# Patient Record
Sex: MALE | Race: WHITE | NOT HISPANIC OR LATINO | Employment: OTHER | URBAN - METROPOLITAN AREA
[De-identification: names, ages, dates, MRNs, and addresses within clinical notes are randomized per-mention and may not be internally consistent; named-entity substitution may affect disease eponyms.]

---

## 2021-09-18 ENCOUNTER — HOSPITAL ENCOUNTER (INPATIENT)
Facility: CLINIC | Age: 76
LOS: 3 days | Discharge: HOME | DRG: 392 | End: 2021-09-21
Attending: INTERNAL MEDICINE | Admitting: INTERNAL MEDICINE
Payer: COMMERCIAL

## 2021-09-18 ENCOUNTER — APPOINTMENT (OUTPATIENT)
Dept: CT IMAGING | Facility: CLINIC | Age: 76
End: 2021-09-18
Attending: EMERGENCY MEDICINE
Payer: COMMERCIAL

## 2021-09-18 ENCOUNTER — HOSPITAL ENCOUNTER (EMERGENCY)
Facility: CLINIC | Age: 76
Discharge: HOME | End: 2021-09-18
Attending: EMERGENCY MEDICINE | Admitting: EMERGENCY MEDICINE
Payer: COMMERCIAL

## 2021-09-18 VITALS
RESPIRATION RATE: 20 BRPM | BODY MASS INDEX: 27 KG/M2 | TEMPERATURE: 97.8 F | HEART RATE: 64 BPM | HEIGHT: 66 IN | SYSTOLIC BLOOD PRESSURE: 129 MMHG | WEIGHT: 168 LBS | OXYGEN SATURATION: 98 % | DIASTOLIC BLOOD PRESSURE: 67 MMHG

## 2021-09-18 DIAGNOSIS — K50.012 TERMINAL ILEITIS, WITH INTESTINAL OBSTRUCTION (H): ICD-10-CM

## 2021-09-18 DIAGNOSIS — K56.609 SBO (SMALL BOWEL OBSTRUCTION) (H): Primary | ICD-10-CM

## 2021-09-18 DIAGNOSIS — K29.70 GASTRITIS WITHOUT BLEEDING, UNSPECIFIED CHRONICITY, UNSPECIFIED GASTRITIS TYPE: ICD-10-CM

## 2021-09-18 DIAGNOSIS — K56.609 SBO (SMALL BOWEL OBSTRUCTION) (H): ICD-10-CM

## 2021-09-18 LAB
ALBUMIN SERPL-MCNC: 3.2 G/DL (ref 3.4–5)
ALP SERPL-CCNC: 85 U/L (ref 40–150)
ALT SERPL W P-5'-P-CCNC: 26 U/L (ref 0–70)
ANION GAP SERPL CALCULATED.3IONS-SCNC: 5 MMOL/L (ref 3–14)
AST SERPL W P-5'-P-CCNC: 18 U/L (ref 0–45)
BASOPHILS # BLD AUTO: 0 10E3/UL (ref 0–0.2)
BASOPHILS NFR BLD AUTO: 0 %
BILIRUB SERPL-MCNC: 0.6 MG/DL (ref 0.2–1.3)
BUN SERPL-MCNC: 16 MG/DL (ref 7–30)
CALCIUM SERPL-MCNC: 8.5 MG/DL (ref 8.5–10.1)
CHLORIDE BLD-SCNC: 107 MMOL/L (ref 94–109)
CO2 SERPL-SCNC: 28 MMOL/L (ref 20–32)
CREAT SERPL-MCNC: 1.13 MG/DL (ref 0.66–1.25)
EOSINOPHIL # BLD AUTO: 0.1 10E3/UL (ref 0–0.7)
EOSINOPHIL NFR BLD AUTO: 2 %
ERYTHROCYTE [DISTWIDTH] IN BLOOD BY AUTOMATED COUNT: 14.4 % (ref 10–15)
GFR SERPL CREATININE-BSD FRML MDRD: 64 ML/MIN/1.73M2
GLUCOSE BLD-MCNC: 103 MG/DL (ref 70–99)
HCT VFR BLD AUTO: 43.4 % (ref 40–53)
HGB BLD-MCNC: 14.3 G/DL (ref 13.3–17.7)
HOLD SPECIMEN: NORMAL
IMM GRANULOCYTES # BLD: 0 10E3/UL
IMM GRANULOCYTES NFR BLD: 0 %
LACTATE SERPL-SCNC: 0.7 MMOL/L (ref 0.7–2)
LYMPHOCYTES # BLD AUTO: 1.7 10E3/UL (ref 0.8–5.3)
LYMPHOCYTES NFR BLD AUTO: 21 %
MAGNESIUM SERPL-MCNC: 1.7 MG/DL (ref 1.8–2.6)
MCH RBC QN AUTO: 29.8 PG (ref 26.5–33)
MCHC RBC AUTO-ENTMCNC: 32.9 G/DL (ref 31.5–36.5)
MCV RBC AUTO: 90 FL (ref 78–100)
MONOCYTES # BLD AUTO: 0.9 10E3/UL (ref 0–1.3)
MONOCYTES NFR BLD AUTO: 11 %
NEUTROPHILS # BLD AUTO: 5.4 10E3/UL (ref 1.6–8.3)
NEUTROPHILS NFR BLD AUTO: 66 %
NRBC # BLD AUTO: 0 10E3/UL
NRBC BLD AUTO-RTO: 0 /100
PLATELET # BLD AUTO: 186 10E3/UL (ref 150–450)
POTASSIUM BLD-SCNC: 3.8 MMOL/L (ref 3.5–5)
POTASSIUM BLD-SCNC: 4 MMOL/L (ref 3.4–5.3)
PROT SERPL-MCNC: 6.8 G/DL (ref 6.8–8.8)
RBC # BLD AUTO: 4.8 10E6/UL (ref 4.4–5.9)
SARS-COV-2 RNA RESP QL NAA+PROBE: NEGATIVE
SODIUM SERPL-SCNC: 140 MMOL/L (ref 133–144)
WBC # BLD AUTO: 8.1 10E3/UL (ref 4–11)

## 2021-09-18 PROCEDURE — 250N000013 HC RX MED GY IP 250 OP 250 PS 637: Performed by: INTERNAL MEDICINE

## 2021-09-18 PROCEDURE — 85025 COMPLETE CBC W/AUTO DIFF WBC: CPT | Performed by: EMERGENCY MEDICINE

## 2021-09-18 PROCEDURE — 87635 SARS-COV-2 COVID-19 AMP PRB: CPT | Performed by: EMERGENCY MEDICINE

## 2021-09-18 PROCEDURE — 96375 TX/PRO/DX INJ NEW DRUG ADDON: CPT | Performed by: EMERGENCY MEDICINE

## 2021-09-18 PROCEDURE — 83735 ASSAY OF MAGNESIUM: CPT | Performed by: INTERNAL MEDICINE

## 2021-09-18 PROCEDURE — 250N000011 HC RX IP 250 OP 636: Performed by: FAMILY MEDICINE

## 2021-09-18 PROCEDURE — 36415 COLL VENOUS BLD VENIPUNCTURE: CPT | Performed by: EMERGENCY MEDICINE

## 2021-09-18 PROCEDURE — 99223 1ST HOSP IP/OBS HIGH 75: CPT | Performed by: INTERNAL MEDICINE

## 2021-09-18 PROCEDURE — 99285 EMERGENCY DEPT VISIT HI MDM: CPT | Performed by: EMERGENCY MEDICINE

## 2021-09-18 PROCEDURE — 84132 ASSAY OF SERUM POTASSIUM: CPT | Performed by: INTERNAL MEDICINE

## 2021-09-18 PROCEDURE — 250N000011 HC RX IP 250 OP 636: Performed by: EMERGENCY MEDICINE

## 2021-09-18 PROCEDURE — 36415 COLL VENOUS BLD VENIPUNCTURE: CPT | Performed by: INTERNAL MEDICINE

## 2021-09-18 PROCEDURE — 120N000001 HC R&B MED SURG/OB

## 2021-09-18 PROCEDURE — 99285 EMERGENCY DEPT VISIT HI MDM: CPT | Mod: 25 | Performed by: EMERGENCY MEDICINE

## 2021-09-18 PROCEDURE — 258N000003 HC RX IP 258 OP 636: Performed by: EMERGENCY MEDICINE

## 2021-09-18 PROCEDURE — C9113 INJ PANTOPRAZOLE SODIUM, VIA: HCPCS | Performed by: FAMILY MEDICINE

## 2021-09-18 PROCEDURE — 96374 THER/PROPH/DIAG INJ IV PUSH: CPT | Mod: 59 | Performed by: EMERGENCY MEDICINE

## 2021-09-18 PROCEDURE — 86141 C-REACTIVE PROTEIN HS: CPT | Performed by: HOSPITALIST

## 2021-09-18 PROCEDURE — 250N000011 HC RX IP 250 OP 636: Performed by: INTERNAL MEDICINE

## 2021-09-18 PROCEDURE — 83605 ASSAY OF LACTIC ACID: CPT | Performed by: EMERGENCY MEDICINE

## 2021-09-18 PROCEDURE — 250N000009 HC RX 250: Performed by: EMERGENCY MEDICINE

## 2021-09-18 PROCEDURE — 96361 HYDRATE IV INFUSION ADD-ON: CPT | Performed by: EMERGENCY MEDICINE

## 2021-09-18 PROCEDURE — 80053 COMPREHEN METABOLIC PANEL: CPT | Performed by: EMERGENCY MEDICINE

## 2021-09-18 PROCEDURE — 74177 CT ABD & PELVIS W/CONTRAST: CPT

## 2021-09-18 PROCEDURE — C9803 HOPD COVID-19 SPEC COLLECT: HCPCS | Performed by: EMERGENCY MEDICINE

## 2021-09-18 RX ORDER — AMLODIPINE BESYLATE 5 MG/1
1 TABLET ORAL EVERY EVENING
COMMUNITY
Start: 2021-09-03

## 2021-09-18 RX ORDER — NALOXONE HYDROCHLORIDE 0.4 MG/ML
0.4 INJECTION, SOLUTION INTRAMUSCULAR; INTRAVENOUS; SUBCUTANEOUS
Status: DISCONTINUED | OUTPATIENT
Start: 2021-09-18 | End: 2021-09-21 | Stop reason: HOSPADM

## 2021-09-18 RX ORDER — CLOPIDOGREL BISULFATE 75 MG/1
1 TABLET ORAL DAILY
Status: ON HOLD | COMMUNITY
Start: 2021-09-03 | End: 2021-10-15

## 2021-09-18 RX ORDER — LEVOFLOXACIN 500 MG/1
500 TABLET, FILM COATED ORAL DAILY
Status: ON HOLD | COMMUNITY
Start: 2021-09-13 | End: 2021-09-21

## 2021-09-18 RX ORDER — NALOXONE HYDROCHLORIDE 0.4 MG/ML
0.2 INJECTION, SOLUTION INTRAMUSCULAR; INTRAVENOUS; SUBCUTANEOUS
Status: DISCONTINUED | OUTPATIENT
Start: 2021-09-18 | End: 2021-09-21 | Stop reason: HOSPADM

## 2021-09-18 RX ORDER — IOPAMIDOL 755 MG/ML
82 INJECTION, SOLUTION INTRAVASCULAR ONCE
Status: COMPLETED | OUTPATIENT
Start: 2021-09-18 | End: 2021-09-18

## 2021-09-18 RX ORDER — SODIUM CHLORIDE AND POTASSIUM CHLORIDE 150; 900 MG/100ML; MG/100ML
INJECTION, SOLUTION INTRAVENOUS CONTINUOUS
Status: DISCONTINUED | OUTPATIENT
Start: 2021-09-18 | End: 2021-09-20

## 2021-09-18 RX ORDER — LANOLIN ALCOHOL/MO/W.PET/CERES
1 CREAM (GRAM) TOPICAL EVERY EVENING
COMMUNITY

## 2021-09-18 RX ORDER — SODIUM CHLORIDE, SODIUM LACTATE, POTASSIUM CHLORIDE, CALCIUM CHLORIDE 600; 310; 30; 20 MG/100ML; MG/100ML; MG/100ML; MG/100ML
INJECTION, SOLUTION INTRAVENOUS CONTINUOUS
Status: DISCONTINUED | OUTPATIENT
Start: 2021-09-18 | End: 2021-09-18 | Stop reason: HOSPADM

## 2021-09-18 RX ORDER — CLOPIDOGREL BISULFATE 75 MG/1
75 TABLET ORAL DAILY
Status: DISCONTINUED | OUTPATIENT
Start: 2021-09-19 | End: 2021-09-21 | Stop reason: HOSPADM

## 2021-09-18 RX ORDER — MIDAZOLAM HYDROCHLORIDE 5 MG/ML
2 INJECTION, SOLUTION INTRAMUSCULAR; INTRAVENOUS ONCE
Status: DISCONTINUED | OUTPATIENT
Start: 2021-09-18 | End: 2021-09-18

## 2021-09-18 RX ORDER — DOXYCYCLINE 100 MG/1
1 CAPSULE ORAL 2 TIMES DAILY
Status: ON HOLD | COMMUNITY
Start: 2021-09-14 | End: 2022-06-28

## 2021-09-18 RX ORDER — PREDNISONE 20 MG/1
2 TABLET ORAL DAILY PRN
Status: ON HOLD | COMMUNITY
Start: 2021-09-14 | End: 2022-06-28

## 2021-09-18 RX ORDER — ACETAMINOPHEN 650 MG/1
650 SUPPOSITORY RECTAL EVERY 6 HOURS PRN
Status: DISCONTINUED | OUTPATIENT
Start: 2021-09-18 | End: 2021-09-21 | Stop reason: HOSPADM

## 2021-09-18 RX ORDER — DOXYCYCLINE 100 MG/1
100 CAPSULE ORAL 2 TIMES DAILY
Status: DISCONTINUED | OUTPATIENT
Start: 2021-09-18 | End: 2021-09-21 | Stop reason: HOSPADM

## 2021-09-18 RX ORDER — ACETAMINOPHEN 500 MG
500 TABLET ORAL EVERY 6 HOURS PRN
COMMUNITY

## 2021-09-18 RX ORDER — MULTIVITAMIN,THER AND MINERALS
1 TABLET ORAL DAILY
Status: ON HOLD | COMMUNITY
End: 2021-09-18

## 2021-09-18 RX ORDER — ALBUTEROL SULFATE 90 UG/1
1-2 AEROSOL, METERED RESPIRATORY (INHALATION) EVERY 6 HOURS PRN
COMMUNITY
Start: 2021-09-14

## 2021-09-18 RX ORDER — TERAZOSIN 5 MG/1
5 CAPSULE ORAL AT BEDTIME
Status: DISCONTINUED | OUTPATIENT
Start: 2021-09-18 | End: 2021-09-21 | Stop reason: HOSPADM

## 2021-09-18 RX ORDER — TERAZOSIN 5 MG/1
1 CAPSULE ORAL AT BEDTIME
COMMUNITY
Start: 2021-09-03

## 2021-09-18 RX ORDER — ATORVASTATIN CALCIUM 10 MG/1
1 TABLET, FILM COATED ORAL EVERY EVENING
COMMUNITY
Start: 2021-09-03

## 2021-09-18 RX ORDER — PNV NO.95/FERROUS FUM/FOLIC AC 28MG-0.8MG
1 TABLET ORAL EVERY EVENING
COMMUNITY

## 2021-09-18 RX ORDER — AMLODIPINE BESYLATE 5 MG/1
5 TABLET ORAL EVERY EVENING
Status: DISCONTINUED | OUTPATIENT
Start: 2021-09-18 | End: 2021-09-21 | Stop reason: HOSPADM

## 2021-09-18 RX ORDER — ACETAMINOPHEN 325 MG/1
650 TABLET ORAL EVERY 6 HOURS PRN
Status: DISCONTINUED | OUTPATIENT
Start: 2021-09-18 | End: 2021-09-21 | Stop reason: HOSPADM

## 2021-09-18 RX ORDER — LIDOCAINE 40 MG/G
CREAM TOPICAL
Status: DISCONTINUED | OUTPATIENT
Start: 2021-09-18 | End: 2021-09-21 | Stop reason: HOSPADM

## 2021-09-18 RX ORDER — LIDOCAINE HYDROCHLORIDE 20 MG/ML
JELLY TOPICAL ONCE
Status: DISCONTINUED | OUTPATIENT
Start: 2021-09-18 | End: 2021-09-18

## 2021-09-18 RX ORDER — ATORVASTATIN CALCIUM 10 MG/1
10 TABLET, FILM COATED ORAL EVERY EVENING
Status: DISCONTINUED | OUTPATIENT
Start: 2021-09-18 | End: 2021-09-21 | Stop reason: HOSPADM

## 2021-09-18 RX ORDER — HYDROMORPHONE HYDROCHLORIDE 1 MG/ML
0.5 INJECTION, SOLUTION INTRAMUSCULAR; INTRAVENOUS; SUBCUTANEOUS
Status: DISCONTINUED | OUTPATIENT
Start: 2021-09-18 | End: 2021-09-18 | Stop reason: HOSPADM

## 2021-09-18 RX ORDER — PANTOPRAZOLE SODIUM 40 MG/1
40 TABLET, DELAYED RELEASE ORAL 2 TIMES DAILY
Status: ON HOLD | COMMUNITY
Start: 2021-09-02 | End: 2021-09-21

## 2021-09-18 RX ORDER — ONDANSETRON 2 MG/ML
4 INJECTION INTRAMUSCULAR; INTRAVENOUS ONCE
Status: COMPLETED | OUTPATIENT
Start: 2021-09-18 | End: 2021-09-18

## 2021-09-18 RX ORDER — MULTIPLE VITAMINS W/ MINERALS TAB 9MG-400MCG
1 TAB ORAL EVERY EVENING
COMMUNITY

## 2021-09-18 RX ADMIN — METRONIDAZOLE 500 MG: 500 INJECTION, SOLUTION INTRAVENOUS at 21:52

## 2021-09-18 RX ADMIN — SODIUM CHLORIDE 61 ML: 9 INJECTION, SOLUTION INTRAVENOUS at 11:06

## 2021-09-18 RX ADMIN — AMLODIPINE BESYLATE 5 MG: 5 TABLET ORAL at 22:11

## 2021-09-18 RX ADMIN — SODIUM CHLORIDE, POTASSIUM CHLORIDE, SODIUM LACTATE AND CALCIUM CHLORIDE: 600; 310; 30; 20 INJECTION, SOLUTION INTRAVENOUS at 18:28

## 2021-09-18 RX ADMIN — SODIUM CHLORIDE, POTASSIUM CHLORIDE, SODIUM LACTATE AND CALCIUM CHLORIDE 1000 ML: 600; 310; 30; 20 INJECTION, SOLUTION INTRAVENOUS at 12:37

## 2021-09-18 RX ADMIN — PANTOPRAZOLE SODIUM 40 MG: 40 INJECTION, POWDER, FOR SOLUTION INTRAVENOUS at 18:37

## 2021-09-18 RX ADMIN — IOPAMIDOL 82 ML: 755 INJECTION, SOLUTION INTRAVENOUS at 11:06

## 2021-09-18 RX ADMIN — ATORVASTATIN CALCIUM 10 MG: 10 TABLET, FILM COATED ORAL at 22:12

## 2021-09-18 RX ADMIN — ONDANSETRON 4 MG: 2 INJECTION INTRAMUSCULAR; INTRAVENOUS at 10:36

## 2021-09-18 RX ADMIN — POTASSIUM CHLORIDE AND SODIUM CHLORIDE: 900; 150 INJECTION, SOLUTION INTRAVENOUS at 21:50

## 2021-09-18 RX ADMIN — TERAZOSIN HYDROCHLORIDE 5 MG: 5 CAPSULE ORAL at 22:12

## 2021-09-18 RX ADMIN — HYDROMORPHONE HYDROCHLORIDE 0.5 MG: 1 INJECTION, SOLUTION INTRAMUSCULAR; INTRAVENOUS; SUBCUTANEOUS at 10:36

## 2021-09-18 ASSESSMENT — MIFFLIN-ST. JEOR: SCORE: 1444.79

## 2021-09-18 NOTE — ED PROVIDER NOTES
History     Chief Complaint   Patient presents with     Abdominal Pain     sharp stabbing pain to the lowert abdomen, onset last night. history of ruptures colon, and history of gastric surgery for weight loss 1995.      HPI  Dai Izquierdo is a 76 year old male with a history of prior gastric ring and pulmonary fibrosis who presents for abdominal pain.  Pain started last evening, sharp and stabbing in the lower abdomen, radiates throughout his abdomen.  Pain is rated as severe.  He has nausea but no vomiting.  No diarrhea.  He denies fever, chills, chest pain, shortness of breath, cough, dysuria, or rash.    Allergies:  No Known Allergies    Problem List:    There are no problems to display for this patient.       Past Medical History:    Pulmonary fibrosis  Hypertension    Past Surgical History:    Gastric sleeve    Family History:    No family history on file.    Social History:  Marital Status:   [2]  Social History     Tobacco Use     Smoking status: Not on file   Substance Use Topics     Alcohol use: Not on file     Drug use: Not on file        Medications:    acetaminophen (TYLENOL) 500 MG tablet  acetaminophen-codeine (TYLENOL #3) 300-30 MG tablet  albuterol (PROAIR HFA/PROVENTIL HFA/VENTOLIN HFA) 108 (90 Base) MCG/ACT inhaler  amLODIPine (NORVASC) 5 MG tablet  atorvastatin (LIPITOR) 10 MG tablet  clopidogrel (PLAVIX) 75 MG tablet  cyanocobalamin (VITAMIN B-12) 1000 MCG tablet  INCRUSE ELLIPTA 62.5 MCG/INH Inhaler  levofloxacin (LEVAQUIN) 500 MG tablet  Multiple Vitamins-Minerals (SUPER THERA ADITYA M) TABS  Multiple Vitamins-Minerals (ZINC PO)  pantoprazole (PROTONIX) 40 MG EC tablet  terazosin (HYTRIN) 5 MG capsule  doxycycline monohydrate (MONODOX) 100 MG capsule  predniSONE (DELTASONE) 20 MG tablet          Review of Systems  Pertinent positives and negatives listed in the HPI, all other systems reviewed and are negative.    Physical Exam   BP: (!) 151/85  Pulse: 70  Temp: 97.8  F (36.6  " C)  Resp: 20  Height: 167.6 cm (5' 6\")  Weight: 76.2 kg (168 lb)  SpO2: 98 %      Physical Exam  Vitals and nursing note reviewed.   Constitutional:       General: He is in acute distress.      Appearance: He is well-developed. He is not diaphoretic.   HENT:      Head: Normocephalic and atraumatic.      Right Ear: External ear normal.      Left Ear: External ear normal.      Nose: Nose normal.   Eyes:      General: No scleral icterus.     Conjunctiva/sclera: Conjunctivae normal.   Cardiovascular:      Rate and Rhythm: Normal rate and regular rhythm.   Pulmonary:      Effort: Pulmonary effort is normal. No respiratory distress.      Breath sounds: No stridor.   Abdominal:      General: There is no distension.      Palpations: Abdomen is soft.      Tenderness: There is generalized abdominal tenderness. There is guarding. There is no rebound.   Musculoskeletal:      Cervical back: Normal range of motion.   Skin:     General: Skin is warm and dry.   Neurological:      Mental Status: He is alert and oriented to person, place, and time.   Psychiatric:         Behavior: Behavior normal.         ED Course        Procedures              Critical Care time:  none               Results for orders placed or performed during the hospital encounter of 09/18/21 (from the past 24 hour(s))   [place] Draw    Narrative    The following orders were created for panel order [place] Draw.  Procedure                               Abnormality         Status                     ---------                               -----------         ------                     Extra Blue Top Tube[[id number]]                              Final result               Extra Red Top Tube[[id number]]                               Final result               Extra Green Top (Lithium...[[id number]]                      Final result               Extra Green Top (Lithium...[[id number]]                                                 Extra Purple Top Tube[[id number]]     "                        Final result                 Please view results for these tests on the individual orders.   CBC with Platelets & Differential    Narrative    The following orders were created for panel order CBC with Platelets & Differential.  Procedure                               Abnormality         Status                     ---------                               -----------         ------                     CBC with platelets and d...[[id number]]                      Final result                 Please view results for these tests on the individual orders.   Comprehensive metabolic panel   Result Value Ref Range    Sodium 140 133 - 144 mmol/L    Potassium 4.0 3.4 - 5.3 mmol/L    Chloride 107 94 - 109 mmol/L    Carbon Dioxide (CO2) 28 20 - 32 mmol/L    Anion Gap 5 3 - 14 mmol/L    Urea Nitrogen 16 7 - 30 mg/dL    Creatinine 1.13 0.66 - 1.25 mg/dL    Calcium 8.5 8.5 - 10.1 mg/dL    Glucose 103 (H) 70 - 99 mg/dL    Alkaline Phosphatase 85 40 - 150 U/L    AST 18 0 - 45 U/L    ALT 26 0 - 70 U/L    Protein Total 6.8 6.8 - 8.8 g/dL    Albumin 3.2 (L) 3.4 - 5.0 g/dL    Bilirubin Total 0.6 0.2 - 1.3 mg/dL    GFR Estimate 64 >60 mL/min/1.73m2   Lactic acid whole blood   Result Value Ref Range    Lactic Acid 0.7 0.7 - 2.0 mmol/L   Extra Blue Top Tube   Result Value Ref Range    Hold Specimen JIC    Extra Red Top Tube   Result Value Ref Range    Hold Specimen JIC    Extra Green Top (Lithium Heparin) Tube   Result Value Ref Range    Hold Specimen JIC    Extra Purple Top Tube   Result Value Ref Range    Hold Specimen JIC    CBC with platelets and differential   Result Value Ref Range    WBC Count 8.1 4.0 - 11.0 10e3/uL    RBC Count 4.80 4.40 - 5.90 10e6/uL    Hemoglobin 14.3 13.3 - 17.7 g/dL    Hematocrit 43.4 40.0 - 53.0 %    MCV 90 78 - 100 fL    MCH 29.8 26.5 - 33.0 pg    MCHC 32.9 31.5 - 36.5 g/dL    RDW 14.4 10.0 - 15.0 %    Platelet Count 186 150 - 450 10e3/uL    % Neutrophils 66 %    % Lymphocytes 21 %    %  Monocytes 11 %    % Eosinophils 2 %    % Basophils 0 %    % Immature Granulocytes 0 %    NRBCs per 100 WBC 0 <1 /100    Absolute Neutrophils 5.4 1.6 - 8.3 10e3/uL    Absolute Lymphocytes 1.7 0.8 - 5.3 10e3/uL    Absolute Monocytes 0.9 0.0 - 1.3 10e3/uL    Absolute Eosinophils 0.1 0.0 - 0.7 10e3/uL    Absolute Basophils 0.0 0.0 - 0.2 10e3/uL    Absolute Immature Granulocytes 0.0 <=0.0 10e3/uL    Absolute NRBCs 0.0 10e3/uL   CT Abdomen Pelvis w Contrast    Narrative    CT ABDOMEN/PELVIS WITH CONTRAST September 18, 2021 11:21 AM    CLINICAL HISTORY: Nausea and vomiting.    TECHNIQUE: CT scan of the abdomen and pelvis was performed following  injection of IV contrast. Multiplanar reformats were obtained. Dose  reduction techniques were used.  CONTRAST: 82 mL Isovue 370.    COMPARISON: None.    FINDINGS:   LOWER CHEST: Mild to moderate fibrotic changes are noted at both lung  bases. Small hiatal hernia. Coronary artery calcification.    HEPATOBILIARY: A small calcified gallstone is noted within the  gallbladder. No hepatic masses are identified.    PANCREAS: Normal.    SPLEEN: Normal.    ADRENAL GLANDS: Normal.    KIDNEYS/BLADDER: Unremarkable. No hydronephrosis.    BOWEL: Postoperative changes are noted in the rectosigmoid region.  There is an approximately 25 cm segment of mildly thickened terminal  ileum, with mild associated inflammatory stranding. Proximal to this  thickened segment of terminal ileum, there are multiple loops of  dilated fluid-filled small bowel, suggesting some degree of associated  small bowel obstruction. Unremarkable appendix.    PELVIC ORGANS: Multiple images through the pelvis are severely  degraded due to artifact from bilateral hip arthroplasties.    LYMPH NODES: No enlarged lymph nodes are identified in the abdomen or  pelvis.    VASCULATURE: Moderate atherosclerotic aortoiliac calcification.  Prominent metallic artifact in the left upper quadrant may represent  vascular coils, possibly  within a splenic artery or renal artery  aneurysm.    ADDITIONAL FINDINGS: Trace amount of mesenteric fluid is noted within  the right lower quadrant. There are several small fat-containing  ventral hernias in the supraumbilical region.    MUSCULOSKELETAL: Unremarkable.      Impression    IMPRESSION:   1.  Mildly thickened proximally 25 cm segment of terminal ileum with  mild surrounding inflammatory stranding, consistent with terminal  ileitis, most likely infectious or inflammatory in etiology.  2.  Proximal to this thickened segment of terminal ileum, there are  multiple loops of dilated fluid-filled small bowel, suggesting some  degree of associated small bowel obstruction.  3.  Cholelithiasis.       Medications   HYDROmorphone (PF) (DILAUDID) injection 0.5 mg (0.5 mg Intravenous Given 9/18/21 1036)   lactated ringers infusion (has no administration in time range)   ondansetron (ZOFRAN) injection 4 mg (4 mg Intravenous Given 9/18/21 1036)   iopamidol (ISOVUE-370) solution 82 mL (82 mLs Intravenous Given 9/18/21 1106)   sodium chloride 0.9 % bag 500mL for CT scan flush use (61 mLs Intravenous Given 9/18/21 1106)   lactated ringers BOLUS 1,000 mL (1,000 mLs Intravenous New Bag 9/18/21 1237)       Assessments & Plan (with Medical Decision Making)   76-year-old male with a history of prior gastric sleeve and abdominal surgeries as well as a history of pulmonary fibrosis who presents for abdominal pain with nausea.  Blood pressure is 135/78, temperature is 36.6  C, heart 57, SPO2 of 99% room air.  He is given IV hydromorphone and fluids as well as IV ondansetron for symptoms.  White blood cell count is 8.1.  Lactic acid is normal which is reassuring.  Electrolytes are within normal limits.  CT of the abdomen/pelvis obtained, images reviewed independently as well as radiology read reviewed, positive for inflammation around the terminal ileum consistent with terminal ileitis.  Also with proximal dilated fluid-filled  loops of bowel consistent with a small bowel obstruction.  The patient requires admission to the hospital for further treatment.  Unfortunately there are no hospital beds available here and the patient require transfer.  There is a bed available at [facility].  I have discussed the case with the on-call hospitalist, Dr. Swanson.  He is requested we place an NG tube.  I ordered midazolam and lidocaine to facilitate this, however the patient refused stating he wanted to try passing this obstruction without that at this time but if things got worse he would have it done.  We discussed the risks of perforation and worsening symptoms in route to the other hospital and delayed treatment but the patient insisted on waiting at this time.    I have reviewed the nursing notes.    I have reviewed the findings, diagnosis, plan and need for follow up with the patient.       New Prescriptions    No medications on file       Final diagnoses:   SBO (small bowel obstruction) (H)       9/18/2021   [facility] EMERGENCY DEPT     Giovani Tamayo MD  09/18/21 [id number]

## 2021-09-19 LAB
ALBUMIN SERPL-MCNC: 3 G/DL (ref 3.5–5)
ALP SERPL-CCNC: 76 U/L (ref 45–120)
ALT SERPL W P-5'-P-CCNC: 15 U/L (ref 0–45)
ANION GAP SERPL CALCULATED.3IONS-SCNC: 11 MMOL/L (ref 5–18)
AST SERPL W P-5'-P-CCNC: 15 U/L (ref 0–40)
BASOPHILS # BLD AUTO: 0 10E3/UL (ref 0–0.2)
BASOPHILS NFR BLD AUTO: 0 %
BILIRUB SERPL-MCNC: 0.7 MG/DL (ref 0–1)
BUN SERPL-MCNC: 11 MG/DL (ref 8–28)
C DIFF TOX B STL QL: NEGATIVE
C REACTIVE PROTEIN LHE: 0.7 MG/DL (ref 0–0.8)
CALCIUM SERPL-MCNC: 8.6 MG/DL (ref 8.5–10.5)
CHLORIDE BLD-SCNC: 105 MMOL/L (ref 98–107)
CO2 SERPL-SCNC: 25 MMOL/L (ref 22–31)
CREAT SERPL-MCNC: 1.03 MG/DL (ref 0.7–1.3)
EOSINOPHIL # BLD AUTO: 0.3 10E3/UL (ref 0–0.7)
EOSINOPHIL NFR BLD AUTO: 3 %
ERYTHROCYTE [DISTWIDTH] IN BLOOD BY AUTOMATED COUNT: 14.8 % (ref 10–15)
GFR SERPL CREATININE-BSD FRML MDRD: 71 ML/MIN/1.73M2
GLUCOSE BLD-MCNC: 78 MG/DL (ref 70–125)
HCT VFR BLD AUTO: 41.9 % (ref 40–53)
HGB BLD-MCNC: 13.7 G/DL (ref 13.3–17.7)
IMM GRANULOCYTES # BLD: 0 10E3/UL
IMM GRANULOCYTES NFR BLD: 0 %
LYMPHOCYTES # BLD AUTO: 1.7 10E3/UL (ref 0.8–5.3)
LYMPHOCYTES NFR BLD AUTO: 23 %
MAGNESIUM SERPL-MCNC: 1.9 MG/DL (ref 1.8–2.6)
MCH RBC QN AUTO: 29.7 PG (ref 26.5–33)
MCHC RBC AUTO-ENTMCNC: 32.7 G/DL (ref 31.5–36.5)
MCV RBC AUTO: 91 FL (ref 78–100)
MONOCYTES # BLD AUTO: 0.8 10E3/UL (ref 0–1.3)
MONOCYTES NFR BLD AUTO: 11 %
NEUTROPHILS # BLD AUTO: 4.5 10E3/UL (ref 1.6–8.3)
NEUTROPHILS NFR BLD AUTO: 63 %
NRBC # BLD AUTO: 0 10E3/UL
NRBC BLD AUTO-RTO: 0 /100
PHOSPHATE SERPL-MCNC: 3.5 MG/DL (ref 2.5–4.5)
PLATELET # BLD AUTO: 191 10E3/UL (ref 150–450)
POTASSIUM BLD-SCNC: 4.2 MMOL/L (ref 3.5–5)
PROCALCITONIN SERPL-MCNC: 0.04 NG/ML (ref 0–0.49)
PROT SERPL-MCNC: 5.8 G/DL (ref 6–8)
RBC # BLD AUTO: 4.62 10E6/UL (ref 4.4–5.9)
SODIUM SERPL-SCNC: 141 MMOL/L (ref 136–145)
WBC # BLD AUTO: 7.4 10E3/UL (ref 4–11)

## 2021-09-19 PROCEDURE — 87506 IADNA-DNA/RNA PROBE TQ 6-11: CPT | Performed by: HOSPITALIST

## 2021-09-19 PROCEDURE — 84100 ASSAY OF PHOSPHORUS: CPT | Performed by: INTERNAL MEDICINE

## 2021-09-19 PROCEDURE — 83735 ASSAY OF MAGNESIUM: CPT | Performed by: HOSPITALIST

## 2021-09-19 PROCEDURE — 99233 SBSQ HOSP IP/OBS HIGH 50: CPT | Performed by: HOSPITALIST

## 2021-09-19 PROCEDURE — 84145 PROCALCITONIN (PCT): CPT | Performed by: HOSPITALIST

## 2021-09-19 PROCEDURE — 87493 C DIFF AMPLIFIED PROBE: CPT | Performed by: HOSPITALIST

## 2021-09-19 PROCEDURE — 36415 COLL VENOUS BLD VENIPUNCTURE: CPT | Performed by: HOSPITALIST

## 2021-09-19 PROCEDURE — 250N000013 HC RX MED GY IP 250 OP 250 PS 637: Performed by: INTERNAL MEDICINE

## 2021-09-19 PROCEDURE — 120N000001 HC R&B MED SURG/OB

## 2021-09-19 PROCEDURE — 87209 SMEAR COMPLEX STAIN: CPT | Performed by: HOSPITALIST

## 2021-09-19 PROCEDURE — 87177 OVA AND PARASITES SMEARS: CPT | Performed by: HOSPITALIST

## 2021-09-19 PROCEDURE — 85025 COMPLETE CBC W/AUTO DIFF WBC: CPT | Performed by: INTERNAL MEDICINE

## 2021-09-19 PROCEDURE — 82040 ASSAY OF SERUM ALBUMIN: CPT | Performed by: INTERNAL MEDICINE

## 2021-09-19 PROCEDURE — 250N000011 HC RX IP 250 OP 636: Performed by: INTERNAL MEDICINE

## 2021-09-19 RX ORDER — PANTOPRAZOLE SODIUM 20 MG/1
40 TABLET, DELAYED RELEASE ORAL
Status: DISCONTINUED | OUTPATIENT
Start: 2021-09-19 | End: 2021-09-21 | Stop reason: HOSPADM

## 2021-09-19 RX ADMIN — PANTOPRAZOLE SODIUM 40 MG: 20 TABLET, DELAYED RELEASE ORAL at 17:27

## 2021-09-19 RX ADMIN — POTASSIUM CHLORIDE AND SODIUM CHLORIDE: 900; 150 INJECTION, SOLUTION INTRAVENOUS at 09:47

## 2021-09-19 RX ADMIN — TERAZOSIN HYDROCHLORIDE 5 MG: 5 CAPSULE ORAL at 20:46

## 2021-09-19 RX ADMIN — ATORVASTATIN CALCIUM 10 MG: 10 TABLET, FILM COATED ORAL at 20:46

## 2021-09-19 RX ADMIN — METRONIDAZOLE 500 MG: 500 INJECTION, SOLUTION INTRAVENOUS at 09:47

## 2021-09-19 RX ADMIN — AMLODIPINE BESYLATE 5 MG: 5 TABLET ORAL at 20:46

## 2021-09-19 RX ADMIN — METRONIDAZOLE 500 MG: 500 INJECTION, SOLUTION INTRAVENOUS at 20:46

## 2021-09-19 RX ADMIN — UMECLIDINIUM 1 PUFF: 62.5 AEROSOL, POWDER ORAL at 09:58

## 2021-09-19 RX ADMIN — POTASSIUM CHLORIDE AND SODIUM CHLORIDE: 900; 150 INJECTION, SOLUTION INTRAVENOUS at 19:10

## 2021-09-19 NOTE — PROGRESS NOTES
[facility] Medicine PROGRESS NOTE      Identification/Summary:   Dai Izquierdo is a 76 year old old male with history of diverticulitis pulmonary fibrosis, and perforated colon in the past, status post vertical band gastroplasty 1995, presented with abdominal pain, diarrhea that has been worsening associated with occasional diarrhea over the past week.  CT abdomen with mildly thickened terminal ileum, surrounding inflammatory stranding consistent with terminal ileitis, some degree of bowel obstruction.    Has medical history of reflux, diverticulitis, hypertension, CVA, COPD, weight loss surgery, prior colon rupture.  As an outpatient takes amlodipine, Lipitor, Plavix, Levaquin, Protonix, Terazosin, inhaler.    GI consulted secondary to terminal ileitis.    Vitals this morning are normal.  Labs pending this morning.  Assessment and Plan:   Inflammatory changes of small bowel  Small bowel obstruction  History of gastric ring surgery, sounds like chronic gastric obstructive symptoms  Gastroesophageal reflux, Fung's by recent EGD  Unintentional weight loss  Lactic acid normal  Patient admits to eating some questionable soup recently, will check stool studies  GI consulted  Keep n.p.o., place NG tube if having lots of pain, distention, nausea and vomiting  Add CRP, procalcitonin  Recent endoscopy with Fung's esophagus  Has upcoming plans for surgery to remove gastric ring    Essential hypertension  Takes amlodipine as an outpatient  Blood pressure currently well controlled  Amlodipine is continued    GERD  Recent EGD with Fung's esophagus  Defer to GI recommendations    History of stroke  Continue on statin, Plavix being held due to upcoming surgery to remove gastric ring.    COPD  Pulmonary status seems to be at baseline  Continue home Umeclidinium inhaler    Diet: NPO for Medical/Clinical Reasons Except for: Meds, Ice Chips  Fluids: Normal saline plus potassium at 100/h  Pain meds: Tylenol as  needed  Therapy: None at this time  DVT Prophylaxis: Ambulation, SCDs  Code Status: Full Code  Disposition: Expect 1-2 more days in the hospital    Interval History/Subjective:  Still complains of decent mild abdominal pain worse with palpation and moving.  Overall pain has improved from initially.  Says he is passing gas, no loose stools today.  Says that he did have some bad soup a couple of days ago and had some diarrhea afterwards.  Felt that it was not related to the soup so ate some more soup and had more symptoms.  Has a history of colon rupture in the past.  Says that he is currently taking Levaquin for what sounds like bronchitis, he has taken about 6 days of this so far.  He denies chest pain, shortness of breath, or nausea.  He rates his abdominal pain at about 3 out of 10.  He did feel some worsening acid reflux this morning.  Says that he is lost about 40 pounds this year.  Says it is very common for him to regurgitate undigested food.    Physical Exam/Objective:  Gen: no acute distress, comfortable, alert, pleasant  ENT: no scleral icterus  Pulm: lungs are clear without wheezing, crackles  CV: regular rate and rhythm, no pitting edema  GI: abdomen is soft, tender to palpation primarily left lower quadrant with some guarding  Psych: appropriate affect    Medications:     amLODIPine  5 mg Oral QPM     atorvastatin  10 mg Oral QPM     clopidogrel  75 mg Oral Daily     doxycycline monohydrate  100 mg Oral BID     metroNIDAZOLE  500 mg Intravenous Q12H     sodium chloride (PF)  3 mL Intracatheter Q8H     terazosin  5 mg Oral At Bedtime     umeclidinium  1 puff Inhalation Daily       Lucas Mayfield DO   Hospitalist  [facility]

## 2021-09-19 NOTE — ED NOTES
Pt had some acid reflux pain and belching. Takes PTA 40 mg of Protonix at HS. Orders received and IV Protonix given. Pt is NPO. Voiding freely. Pt reports some abdominal discomfort; declined any pain medication.

## 2021-09-19 NOTE — PLAN OF CARE
Problem: Fluid Deficit (Intestinal Obstruction)  Goal: Fluid Balance  Outcome: Improving     Problem: Nausea and Vomiting (Intestinal Obstruction)  Goal: Nausea and Vomiting Relief  Outcome: Improving     Problem: Pain (Intestinal Obstruction)  Goal: Acceptable Pain Control  Outcome: Improving     Pt denies having nausea or pain, only some abdominal discomfort. Refused offer of pain meds. Stool sample collected and sent. Protonix was ordered. Was able to tolerate a low fiber diet. Call light within reach.

## 2021-09-19 NOTE — CONSULTS
GI CONSULT NOTE      Name: Dai Izquierdo    Medical Record #: [id number]    YOB: 1945    Date: 9/19/2021    CC: We were consulted by Alonso Swanson MD to see Giovani Tamayo  to see Dai Izquierdo in regards to terminal ileitis and small bowel obstruction.     HPI: This is a 76 year old male with a history of VBG in 1995 with Dr Villafuerte, pulmonary fibrosis, GEE on CPAP, asplenia, diverticulitis with abscess resulting in  colon perforation ~2005 in [place], chronic anticoagulation for hx CVA who presented with abd pain and vomiting.  Patient reports chronic vomiting / regurgitation for the past 6 months which he relates to his VBG and food getting stuck in the silastic ring.  He had EGD 3 days ago revealing mild resistance at the VBG ring but scope was able to traverse the area.  He has not had any worsening vomiting/regurgitation in the past couple of weeks.      He reports diarrhea from this past Tuesday through Thursday.  He did not have any blood in his stools and passed about 4 bowel movements per day.  He has not had a BM in 2 days now.  He has acute onset of LLQ abdominal pain on Friday evening characterized as sharp and stabbing and presented to outside ER ([facility]).  CT there showed: 1. Mildly thickened approximately 25 cm segment of terminal ileum with mild surrounding inflammatory stranding, consistent with terminal ileitis, most likely infectious or inflammatory in etiology. 2.  Proximal to this thickened segment of terminal ileum, there are multiple loops of dilated fluid-filled small bowel, suggesting some degree of associated small bowel obstruction.     Patient denies any abdominal distention in conjunction with this illness.  He reports he has had mid-lower abdominal pain near the midline for the past 1 month.  He notices the pain more when he palpates the area.  He still 'feels sore' in the area on exam but reports feeling much better than when he had experienced on Friday  night.      Patient has had several colonoscopies with us.  Last colonoscopy was June 2018 showing colo-colonic anastomosis and otherwise normal.  His father had colon cancer.  Per prior imaging, the anastomosis appears to be approximately 25cm from the anus.      EGD 9/16/ 2021 was done through [facility] for dysphagia.  Findings as follows:       EXAM: 10 cm gastric pouch from 37-47 cm from incisors appearing erythematous, friable. Site of gastric ring was traversed with mild resistance, not dilated.     RECS: If you choose to have your gastric ring removed, please return for additional EGD after this has taken place and we will need to biopsy your gastric polyps and possible Fung's esophagus. There was remnants of pills in your pouch/esophagus today that prevented full biopsies.     Continue pantoprazole taken 30 minutes before breakfast and dinner every day.    If you find that food is going down slowly, remain upright for 2-3 hours after meals; walk for 15 minutes if you are able to after eating. Liquid diet is likely to be best tolerated and to drain more quickly from the narrowing at your gastric ring (soup, smoothies, shakes, potatoes).         Past medical history  As above    Family history  Father with colon cancer.     Social history  Social History     Tobacco Use     Smoking status: Not on file   Substance Use Topics     Alcohol use: Not on file     Drug use: Not on file       Medications:   No current outpatient medications on file.          Allergies:  No Known Allergies       REVIEW OF SYSTEMS (ROS): Review of systems is as per HPI.  Remainder of complete review of systems is negative.      PHYSICAL EXAMINATION:      /56 (BP Location: Left arm)   Pulse 73   Temp 97.5  F (36.4  C) (Oral)   Resp 20   SpO2 95%   GEN: Well developed, well nourished 76 year old in no acute distress.  HEENT: sclera anicteric, moist mucous membranes, neck soft and supple.  LYMPH: No cervical lymphadenopathy  PULM:  lungs clear to auscultation bilaterally.  CARDIO: Regular rate and rhythm  GI: Non-distended.  Bowel sounds positive.  Soft.  Non-tender to palpation.  No guarding. Large midline scar.    EXT: warm, no lower extremity edema  NEURO: Alert and oriented.  Speech fluid.    PSYCH: Mental status appropriate, mood and affect normal.      LABS and IMAGING  Recent Labs   Lab 09/18/21  1031   WBC 8.1   RBC 4.80   HGB 14.3   HCT 43.4   MCV 90   MCH 29.8   MCHC 32.9   RDW 14.4         Recent Labs   Lab 09/18/21  1031      CO2 28   BUN 16     Recent Labs   Lab 09/18/21  1031   ALKPHOS 85   AST 18   ALT 26     No results found for: INR, TROPONIN, TROPI, TROPONIN, TROPR, TROPN    CT Abdomen Pelvis w Contrast    Result Date: 9/18/2021  CT ABDOMEN/PELVIS WITH CONTRAST September 18, 2021 11:21 AM CLINICAL HISTORY: Nausea and vomiting. TECHNIQUE: CT scan of the abdomen and pelvis was performed following injection of IV contrast. Multiplanar reformats were obtained. Dose reduction techniques were used. CONTRAST: 82 mL Isovue 370. COMPARISON: None. FINDINGS: LOWER CHEST: Mild to moderate fibrotic changes are noted at both lung bases. Small hiatal hernia. Coronary artery calcification. HEPATOBILIARY: A small calcified gallstone is noted within the gallbladder. No hepatic masses are identified. PANCREAS: Normal. SPLEEN: Normal. ADRENAL GLANDS: Normal. KIDNEYS/BLADDER: Unremarkable. No hydronephrosis. BOWEL: Postoperative changes are noted in the rectosigmoid region. There is an approximately 25 cm segment of mildly thickened terminal ileum, with mild associated inflammatory stranding. Proximal to this thickened segment of terminal ileum, there are multiple loops of dilated fluid-filled small bowel, suggesting some degree of associated small bowel obstruction. Unremarkable appendix. PELVIC ORGANS: Multiple images through the pelvis are severely degraded due to artifact from bilateral hip arthroplasties. LYMPH NODES: No  enlarged lymph nodes are identified in the abdomen or pelvis. VASCULATURE: Moderate atherosclerotic aortoiliac calcification. Prominent metallic artifact in the left upper quadrant may represent vascular coils, possibly within a splenic artery or renal artery aneurysm. ADDITIONAL FINDINGS: Trace amount of mesenteric fluid is noted within the right lower quadrant. There are several small fat-containing ventral hernias in the supraumbilical region. MUSCULOSKELETAL: Unremarkable.     IMPRESSION: 1.  Mildly thickened approximately 25 cm segment of terminal ileum with mild surrounding inflammatory stranding, consistent with terminal ileitis, most likely infectious or inflammatory in etiology. 2.  Proximal to this thickened segment of terminal ileum, there are multiple loops of dilated fluid-filled small bowel, suggesting some degree of associated small bowel obstruction. 3.  Cholelithiasis. DANA DAMON MD   SYSTEM ID:  [id number]       ASSESSMENT  1. Abdominal pain and diarrhea.  CT shows mild thickening covering 25cm of TI with mild upstream dilation.  Doubtful that pt has SBO, as he did not have abd distention and did not have change in his chronic underlying regurgitation/vomiting.  Favor obtaining stool studies to assess infection.  In regards to other causes, pt older than expected for IBD. Nonetheless, recommend outpatient colonoscopy for eval and biopsy of TI.      2. Regurgitation / vomiting.  Pt with Hx VBG in mid 1990's.  Has had chronic regurgitation / vomiting which he relates to his silastic ring.  Pt has been starting process of having the ring removed.    3. Hx perforted colon due to diverticulitis - approx 15 years ago  4. FHx colon cancer - last colonsocopy 2018  5. Hx splenic aneurysm - s/p coiling.  6. Hx stroke - chronically anticoagulated with Plavix.     Patient Active Problem List   Diagnosis     SBO (small bowel obstruction) (H)     PLAN  1. If tolerates clear liquids, then low-residue  diet.  2. Check stool culture if able.   3. Outpatient colonoscopy with evaluation of TI.  4. Outpatient upper GI follow through.  5. Referral will be arranged for bariatric surgeon.     Discussed with Dr Tsang.   Thank you for asking to consult on this patient.    Madhav Valadez PA-C PA-C  9/19/2021 8:06 AM  [facility]  [phone]       Addendum:    I have met and examined the patient and agree with the history and findings of ASHLEY Israel above.  This is a 76 year old male with history of VBG, post-prandial vomiting and weight loss over last 6 months, EGD 3 days ago showing slight resistance at level of VBG ring, admitted with abdominal pain and diarrhea.  Imaging suggestive of ileitis.  He is feeling better overall, starting to eat.  Exam: Abdomen soft, nontender and nondistended    Impression:  1. Post-prandial vomiting: Certainly may be due to VBG and wishes to have it removed.  May in part be due to gastrointestinal dysmotility, reflux     2. Ileitis: Nonspecific.  Infectious syndrome possible.  Crohn's a consideration.      Recommendations:  1. Continue PPI  2. Outpatient UGI series with referral to Bariatric Clinic  3. Stool study for infection if able  4. Low residue diet as tolerated  5. Outpatient colonoscopy  6. Suspect discharge tomorrow if otherwise doing well  7. Please call with any questions or concerns    Chaitanya Tsang MD  [facility]

## 2021-09-19 NOTE — PHARMACY-ADMISSION MEDICATION HISTORY
Pharmacy Note - Admission Medication History    Pertinent Provider Information: Pt has not taken Plavix since 9/2 due to holding for endoscopy on 9/9 and instructed to continue holding until after surgery on 9/29.      ______________________________________________________________________    Prior To Admission (PTA) med list completed and updated in EMR.       PTA Med List   Medication Sig Note Last Dose     acetaminophen (TYLENOL) 500 MG tablet Take 500 mg by mouth every 6 hours as needed       acetaminophen-codeine (TYLENOL #3) 300-30 MG tablet Take 1 tablet by mouth daily as needed for pain In right shoulder 9/18/2021: Very rare use      albuterol (PROAIR HFA/PROVENTIL HFA/VENTOLIN HFA) 108 (90 Base) MCG/ACT inhaler Inhale 1-2 puffs into the lungs every 6 hours as needed   at didn't bring     amLODIPine (NORVASC) 5 MG tablet Take 1 tablet by mouth every evening   9/17/2021 at PM     atorvastatin (LIPITOR) 10 MG tablet Take 1 tablet by mouth every evening   9/17/2021 at PM     cholecalciferol (VITAMIN D3) 125 mcg (5000 units) capsule Take 125 mcg by mouth every evening   9/17/2021 at PM     cyanocobalamin (VITAMIN B-12) 1000 MCG tablet Take 1 tablet by mouth every evening   9/17/2021 at PM     doxycycline monohydrate (MONODOX) 100 MG capsule Take 1 capsule by mouth 2 times daily if needed for lung symptoms. x10 days 9/18/2021: Not currently taking not currently taking     Ferrous Sulfate (IRON) 325 (65 Fe) MG tablet Take 1 tablet by mouth every evening  9/17/2021 at PM     INCRUSE ELLIPTA 62.5 MCG/INH Inhaler Inhale 1 puff into the lungs daily  9/18/2021 at AM, didn't bring     levofloxacin (LEVAQUIN) 500 MG tablet Take 500 mg by mouth daily   9/18/2021 at AM     multivitamin w/minerals (THERA-VIT-M) tablet Take 1 tablet by mouth every evening  9/17/2021 at PM     pantoprazole (PROTONIX) 40 MG EC tablet Take 40 mg by mouth 2 times daily   9/18/2021 at AM     potassium 99 MG TABS Take 1 tablet by mouth every  evening   9/17/2021 at PM     predniSONE (DELTASONE) 20 MG tablet Take 2 tablets by mouth daily as needed Keep on hand if needed for lung symptoms. Take for 5 days. 9/18/2021: Not currently taking Not currently taking     terazosin (HYTRIN) 5 MG capsule Take 1 capsule by mouth At Bedtime  9/17/2021 at PM       Information source(s): Patient and CareEverywhere/SureScripts  Method of interview communication: in-person    Summary of Changes to PTA Med List  New: N/A  Discontinued: N/A  Changed: N/A    Patient was asked about OTC/herbal products specifically.  PTA med list reflects this.    In the past week, patient estimated taking medication this percent of the time:  greater than 90%.    Allergies were reviewed, assessed, and updated with the patient.      Patient did not bring any medications to the hospital and can't retrieve from home. No multi-dose medications are available for use during hospital stay.     The information provided in this note is only as accurate as the sources available at the time of the update(s).    Thank you for the opportunity to participate in the care of this patient.    Dimas Blanton [facility]  9/18/2021 9:25 PM

## 2021-09-19 NOTE — H&P
[facility] MEDICINE ADMISSION HISTORY AND PHYSICAL     Assessment & Plan       Dai Izquierdo is a 76 year old old male with history of diverticulitis pulmonary fibrosis, and perforated colon in the past, status post vertical band gastroplasty 1995, presented with abdominal pain that has been worsening associated with occasional diarrhea over the past week.    Ileitis, terminal area  Acute small bowel obstruction  History of diverticulitis in the past  -no history crohn's  -Significant history of diarrhea or rectal bleeding so I feel that Flagyl can be initiated at this time  -Gastroenterology consult  -Patient is n.p.o.  -IV fluids  -Pain management as needed  -Patient refuses NG tube as his nausea and vomiting is resolved    Story of vertical gastric ending in 1995  -Patient has lost about 40 pounds  -This post recent EGD a few days ago    Pulmonary fibrosis  -Not needing oxygen  -Resume inhalers, med rec still pending    History of COVID-19 infection in 2019  -Patient has been vaccinated since then.  -Ordered to be tested negative for COVID-19 over at [place].    Essential hypertension  -Patient is on amlodipine and ACE inhibitor, med rec still pending  -Resume as indicated and tolerated    Dyslipidemia  -Resume home statin    DVTP: Mechanical Prophylaxis/ Sequential Compression Devices, consider Lovenox if no plan for surgery immediately.  Code Status: No Order  Disposition: Inpatient   Expected LOS: 2 days   Goals for the hospitalization: Resolved small bowel obstruction and further evaluation of the lightest.    Chief Complaint  vomiting and abdominal pain     HISTORY     Dai Izquierdo is a 76 year old old male with h/o of above.  Patient has history of gastric banding in the past, diverticulitis and colon perforation in the past p/w right lower quadrant abdominal pain.    She has been having intermittent abdominal pain in the lower quadrant more towards the right side.  He  had 2 episodes of diarrhea which he attributed to the soup that he was eating.  There was no blood in the stool however.  Had some nausea and vomiting later particularly the past couple of days.  Because of the progression of the abdominal pain he went to the emergency room in [place].  He was found to have terminal ileitis as well as small bowel obstruction.  He refused to have an NG tube placed as a his nausea and vomiting has improved.  He was given IV fluids.  No IV antibiotics was given.  Patient was then directly transferred to [facility].  He was reportedly hemodynamically stable and mental status was stable.    Past Medical History     No past medical history on file.    Status post gastric banding in the past  History of pulmonary fibrosis not on oxygen  History of vertical gastric band    Surgical History   No past surgical history on file.   History of colonic perforation in the past    Family History    Reviewed, and No family history on file.   History of colon cancer  Social History      Social History     Tobacco Use     Smoking status: Not on file   Substance Use Topics     Alcohol use: Not on file     Drug use: Not on file        Allergies   No Known Allergies  Prior to Admission Medications      Prior to Admission Medications   Prescriptions Last Dose Informant Patient Reported? Taking?   Ferrous Sulfate (IRON) 325 (65 Fe) MG tablet  Self Yes No   Sig: Take 1 tablet by mouth every evening   INCRUSE ELLIPTA 62.5 MCG/INH Inhaler  Self Yes No   Sig: Inhale 1 puff into the lungs daily   Multiple Vitamins-Minerals (SUPER THERA ADITYA M) TABS  Self Yes No   Sig: Take 1 tablet by mouth daily Melaleuca Activate   Multiple Vitamins-Minerals (ZINC PO)  Self Yes No   Sig: Take 90 mg by mouth every evening    acetaminophen (TYLENOL) 500 MG tablet  Self Yes No   Sig: Take 500 mg by mouth every 6 hours as needed   acetaminophen-codeine (TYLENOL #3) 300-30 MG tablet  Self Yes No   Sig: Take 1 tablet by  mouth daily as needed for pain In right shoulder   albuterol (PROAIR HFA/PROVENTIL HFA/VENTOLIN HFA) 108 (90 Base) MCG/ACT inhaler  Self Yes No   Sig: Inhale 1-2 puffs into the lungs every 6 hours as needed   amLODIPine (NORVASC) 5 MG tablet  Self Yes No   Sig: Take 1 tablet by mouth every evening    atorvastatin (LIPITOR) 10 MG tablet  Self Yes No   Sig: Take 1 tablet by mouth daily (with dinner)   cholecalciferol (VITAMIN D3) 125 mcg (5000 units) capsule  Self Yes No   Sig: Take 125 mcg by mouth daily   clopidogrel (PLAVIX) 75 MG tablet  Self Yes No   Sig: Take 1 tablet by mouth daily    cyanocobalamin (VITAMIN B-12) 1000 MCG tablet  Self Yes No   Sig: Take 1 tablet by mouth every evening    doxycycline monohydrate (MONODOX) 100 MG capsule  Self Yes No   Sig: Take 1 capsule by mouth 2 times daily if needed for lung symptoms   levofloxacin (LEVAQUIN) 500 MG tablet  Self Yes No   Sig: Take 1 tablet by mouth daily   pantoprazole (PROTONIX) 40 MG EC tablet  Self Yes No   Sig: Take 1 tablet by mouth 2 times daily   potassium 99 MG TABS  Self Yes No   Sig: Take 1 tablet by mouth daily   predniSONE (DELTASONE) 20 MG tablet  Self Yes No   Sig: Take 2 tablets by mouth daily Keep on hand if needed for lung symptoms   terazosin (HYTRIN) 5 MG capsule  Self Yes No   Sig: Take 1 capsule by mouth At Bedtime      Facility-Administered Medications: None      Review of Systems     A 12 point comprehensive review of systems was negative except as noted above in HPI.    PHYSICAL EXAMINATION       Vitals      Temp:  [97.7  F (36.5  C)-97.8  F (36.6  C)] 97.7  F (36.5  C)  Pulse:  [57-76] 76  Resp:  [20-22] 22  BP: (129-154)/() 154/73  SpO2:  [87 %-99 %] 93 %    Examination     GENERAL:  Alert, appears comfortable, in no acute distress, appears stated age   HEAD:  Normocephalic, without obvious abnormality, atraumatic   EYES:  Grossly normal   NOSE: Nares normal, septum midline, mucosa normal, no drainage   THROAT: Lips, tongue  normal; mouth is moist   NECK: Supple, no mass   BACK:   Grossly unremarkable   LUNGS:   Clear to auscultation bilaterally, no rales, rhonchi, or wheezing, symmetric chest rise on inhalation, respirations unlabored   CHEST WALL:  No tenderness or deformity   HEART:  Regular rate and rhythm, no murmur, rub, or gallop    ABDOMEN:   Soft, non-tender, bowel sounds active all four quadrants, no masses, tender in the right lower quadrant area.  No peritoneal signs however.   EXTREMITIES: Extremities normal, atraumatic, no cyanosis or edema    SKIN: Dry to touch, no exanthems in the visualized areas   NEURO: Alert, no status stable, no signs of acute stroke   PSYCH: Cooperative, behavior is appropriate,     Pertinent Lab   CBC showed white count of 8.0.  Hemoglobin and platelets were reportedly normal  Basic metabolic panel reported to be normal  CAT scan of the abdomen showed terminal ileitis with small bowel obstruction.  Note of gastric sleeve  Basic metabolic panel shows sodium 140, potassium normal CO2 normal BUN creatinine normal glucose 103  Alk phos 85  AST 18 ALT 26 total bilirubin 0.6  Lactic acid normal  WBC 8.1, hemoglobin 14.3  Platelets 14.4      Pertinent Radiology     Radiology Results:   Recent Results (from the past 24 hour(s))   CT Abdomen Pelvis w Contrast    Narrative    CT ABDOMEN/PELVIS WITH CONTRAST September 18, 2021 11:21 AM    CLINICAL HISTORY: Nausea and vomiting.    TECHNIQUE: CT scan of the abdomen and pelvis was performed following  injection of IV contrast. Multiplanar reformats were obtained. Dose  reduction techniques were used.  CONTRAST: 82 mL Isovue 370.    COMPARISON: None.    FINDINGS:   LOWER CHEST: Mild to moderate fibrotic changes are noted at both lung  bases. Small hiatal hernia. Coronary artery calcification.    HEPATOBILIARY: A small calcified gallstone is noted within the  gallbladder. No hepatic masses are identified.    PANCREAS: Normal.    SPLEEN: Normal.    ADRENAL GLANDS:  Normal.    KIDNEYS/BLADDER: Unremarkable. No hydronephrosis.    BOWEL: Postoperative changes are noted in the rectosigmoid region.  There is an approximately 25 cm segment of mildly thickened terminal  ileum, with mild associated inflammatory stranding. Proximal to this  thickened segment of terminal ileum, there are multiple loops of  dilated fluid-filled small bowel, suggesting some degree of associated  small bowel obstruction. Unremarkable appendix.    PELVIC ORGANS: Multiple images through the pelvis are severely  degraded due to artifact from bilateral hip arthroplasties.    LYMPH NODES: No enlarged lymph nodes are identified in the abdomen or  pelvis.    VASCULATURE: Moderate atherosclerotic aortoiliac calcification.  Prominent metallic artifact in the left upper quadrant may represent  vascular coils, possibly within a splenic artery or renal artery  aneurysm.    ADDITIONAL FINDINGS: Trace amount of mesenteric fluid is noted within  the right lower quadrant. There are several small fat-containing  ventral hernias in the supraumbilical region.    MUSCULOSKELETAL: Unremarkable.      Impression    IMPRESSION:   1.  Mildly thickened approximately 25 cm segment of terminal ileum  with mild surrounding inflammatory stranding, consistent with terminal  ileitis, most likely infectious or inflammatory in etiology.  2.  Proximal to this thickened segment of terminal ileum, there are  multiple loops of dilated fluid-filled small bowel, suggesting some  degree of associated small bowel obstruction.  3.  Cholelithiasis.    DANA DAMON MD         SYSTEM ID:  [id number]     EKG Results: --    Advance Care Planning        Juliano Swanson MD  [facility]   Phone: #[phone]

## 2021-09-20 LAB
C COLI+JEJUNI+LARI FUSA STL QL NAA+PROBE: NOT DETECTED
EC STX1 GENE STL QL NAA+PROBE: NOT DETECTED
EC STX2 GENE STL QL NAA+PROBE: NOT DETECTED
MAGNESIUM SERPL-MCNC: 1.8 MG/DL (ref 1.8–2.6)
NOROV GI+II ORF1-ORF2 JNC STL QL NAA+PR: NOT DETECTED
O+P STL MICRO: NEGATIVE
POTASSIUM BLD-SCNC: 3.9 MMOL/L (ref 3.5–5)
RVA NSP5 STL QL NAA+PROBE: NOT DETECTED
SALMONELLA SP RPOD STL QL NAA+PROBE: NOT DETECTED
SHIGELLA SP+EIEC IPAH STL QL NAA+PROBE: NOT DETECTED
V CHOL+PARA RFBL+TRKH+TNAA STL QL NAA+PR: NOT DETECTED
Y ENTERO RECN STL QL NAA+PROBE: NOT DETECTED

## 2021-09-20 PROCEDURE — 99232 SBSQ HOSP IP/OBS MODERATE 35: CPT | Performed by: HOSPITALIST

## 2021-09-20 PROCEDURE — 120N000001 HC R&B MED SURG/OB

## 2021-09-20 PROCEDURE — 84132 ASSAY OF SERUM POTASSIUM: CPT | Performed by: INTERNAL MEDICINE

## 2021-09-20 PROCEDURE — 250N000011 HC RX IP 250 OP 636: Performed by: INTERNAL MEDICINE

## 2021-09-20 PROCEDURE — 36415 COLL VENOUS BLD VENIPUNCTURE: CPT | Performed by: INTERNAL MEDICINE

## 2021-09-20 PROCEDURE — 83735 ASSAY OF MAGNESIUM: CPT | Performed by: INTERNAL MEDICINE

## 2021-09-20 PROCEDURE — 250N000013 HC RX MED GY IP 250 OP 250 PS 637: Performed by: INTERNAL MEDICINE

## 2021-09-20 RX ADMIN — AMLODIPINE BESYLATE 5 MG: 5 TABLET ORAL at 20:31

## 2021-09-20 RX ADMIN — PANTOPRAZOLE SODIUM 40 MG: 20 TABLET, DELAYED RELEASE ORAL at 16:15

## 2021-09-20 RX ADMIN — ATORVASTATIN CALCIUM 10 MG: 10 TABLET, FILM COATED ORAL at 20:31

## 2021-09-20 RX ADMIN — PANTOPRAZOLE SODIUM 40 MG: 20 TABLET, DELAYED RELEASE ORAL at 06:23

## 2021-09-20 RX ADMIN — TERAZOSIN HYDROCHLORIDE 5 MG: 5 CAPSULE ORAL at 20:31

## 2021-09-20 RX ADMIN — METRONIDAZOLE 500 MG: 500 INJECTION, SOLUTION INTRAVENOUS at 20:31

## 2021-09-20 RX ADMIN — UMECLIDINIUM 1 PUFF: 62.5 AEROSOL, POWDER ORAL at 08:37

## 2021-09-20 RX ADMIN — POTASSIUM CHLORIDE AND SODIUM CHLORIDE: 900; 150 INJECTION, SOLUTION INTRAVENOUS at 06:44

## 2021-09-20 RX ADMIN — METRONIDAZOLE 500 MG: 500 INJECTION, SOLUTION INTRAVENOUS at 08:36

## 2021-09-20 NOTE — PLAN OF CARE
Problem: Adult Inpatient Plan of Care  Goal: Absence of Hospital-Acquired Illness or Injury  Outcome: Improving  Intervention: Identify and Manage Fall Risk  Recent Flowsheet Documentation  Taken 9/19/2021 1630 by Jazmin Manuel, RN  Safety Promotion/Fall Prevention: nonskid shoes/slippers when out of bed  Patient is independent in room. Pt co pain that comes and goes for a 2-5 in right lower quadrant. Denied need for pain medication.

## 2021-09-20 NOTE — PLAN OF CARE
"  Problem: Pain (Intestinal Obstruction)  Goal: Acceptable Pain Control  Outcome: Improving   Patient denies pain throughout the shift, declined need for pain medications. Patient stated, \"I get an occasional sharp pain, but it goes away quickly on its own\". Patient resting/sleeping between cares and assessments.   Problem: Nausea and Vomiting (Intestinal Obstruction)  Goal: Nausea and Vomiting Relief  Outcome: Improving   Patient denies nausea/vomiting throughout the shift.   Problem: Adult Inpatient Plan of Care  Goal: Plan of Care Review  Outcome: Improving   IV fluids infusing at 100ml/hr. Patient tolerating a low fiber diet. Bowel sounds are hypoactive, patient is passing flatus, patient reports two or three loose stools this shift. K/Mag protocols with lab rechecks this morning. Stool studies pending.  Reva Mclean RN  9/20/2021    "

## 2021-09-20 NOTE — PROGRESS NOTES
[facility] Medicine PROGRESS NOTE      Identification/Summary:   Dai Izquierdo is a 76 year old old male with history of diverticulitis pulmonary fibrosis, and perforated colon in the past, status post vertical band gastroplasty 1995, presented with abdominal pain, diarrhea that has been worsening associated with occasional diarrhea over the past week.  CT abdomen with mildly thickened terminal ileum, surrounding inflammatory stranding consistent with terminal ileitis, some degree of bowel obstruction.    Has medical history of reflux, diverticulitis, hypertension, CVA, COPD, weight loss surgery, prior colon rupture.  As an outpatient takes amlodipine, Lipitor, Plavix, Levaquin, Protonix, Terazosin, inhaler.    GI consulted secondary to terminal ileitis.    Stool studies ordered, having diarrhea now, C. difficile negative, stool culture and ova and parasites pending.    Assessment and Plan:   Inflammatory changes of small bowel  Small bowel obstruction possible on CT, seems less likely clinically  History of gastric ring surgery, sounds like chronic gastric obstructive symptoms  Gastroesophageal reflux, Fung's by recent EGD  Unintentional weight loss  Lactic acid normal  Patient admits to eating some questionable soup recently  Stool culture, ova and parasites pending, C. difficile negative  Appreciate GI recommendations  5-6 loose stools in the last 24 hours  CRP, procalcitonin normal  Recent endoscopy with Fung's esophagus  Has upcoming plans for surgery to remove gastric ring  Slightly hesitant to discharge when still having diarrhea, abdominal pain    Essential hypertension  Takes amlodipine as an outpatient  Blood pressure currently well controlled  Amlodipine is continued    GERD  Recent EGD with Fung's esophagus  Defer to GI recommendations    History of stroke  Continue on statin, Plavix being held due to upcoming surgery to remove gastric ring.    COPD  Pulmonary status seems to be at  baseline  Continue home Umeclidinium inhaler    Diet: Low Fiber Diet  Fluids: Discontinued this morning  Pain meds: Tylenol as needed  Therapy: None at this time  DVT Prophylaxis: Ambulation, SCDs  Code Status: Full Code  Disposition: Not clear, possibly discharge later today if diarrhea resolving and GI is okay with this.    Interval History/Subjective:  Still complaining of stabbing abdominal pain.  Also having loose stools, says he is at about 5 or 6 in the last 24 hours.  Says that he got up twice during the night for diarrhea.  No chest pain, shortness of breath.  No melena or hematochezia.    Physical Exam/Objective:  Gen: no acute distress, comfortable, alert, pleasant  ENT: no scleral icterus  Pulm: Comfortably at rest  CV: regular rate and rhythm, no pitting edema  GI: abdomen is soft, nondistended, tender to palpation diffusely with guarding, bowel sounds are normal to hyperactive  Psych: appropriate affect    Medications:     amLODIPine  5 mg Oral QPM     atorvastatin  10 mg Oral QPM     clopidogrel  75 mg Oral Daily     [Held by provider] doxycycline monohydrate  100 mg Oral BID     metroNIDAZOLE  500 mg Intravenous Q12H     pantoprazole  40 mg Oral BID AC     sodium chloride (PF)  3 mL Intracatheter Q8H     terazosin  5 mg Oral At Bedtime     umeclidinium  1 puff Inhalation Daily       Lucas Mayfield DO   Hospitalist  [facility]

## 2021-09-20 NOTE — PROGRESS NOTES
[facility] - Digestive Health Progress Note     IMPRESSION:  77 yo male with PMH of VBG in 1995, pulmonary fibrosis, asplenia, hx of diverticulitis with abscess, on chronic anticoagulation for hx of CVA who was admitted on 9/18/21 for abdominal pain, nausea, and vomiting.    1. Abdominal pains with diarrhea  - Favor acute infectious process. CT with mild thickening of TI with mild upstream dilation. Doubtful of SBO, patient is passing gas and loose stools. Doubtful of new onset IBD given the patient's age and normal colonoscopy in 2018.   - Stool studies pending. C diff negative.     2. Nausea/Vomiting  - This has been a chronic issue, suspected that this may be due to the VBG. He has started the process to having the ring removed, and plans to meet with bariatric surgeons.   - He had an EGD on 9/16/21 which noted a 10cm gastric pouch that appeared erythematous and friable. Site of gastric ring was traversed with mild resistance, not dilated. No biopsies taken due to remnant of pills in the pouch/esophagus. Would consider repeat EGD after gastric ring is removed to biopsy esophagus/stomach for Fung's.   - On PPI BID      RECOMMENDATIONS:  - Continue PPI therapy for GERD/gastritis  - Supportive cares. Pt seems to be improving although still with mild RLQ pains and diarrhea.   - Await stool studies.  - Outpatient colonoscopy in 1-2 months.   - Likely ok for discharge tomorrow.     Disposition:  I anticipate he will continue to improve. He is somewhat nervous about going home today and feels tomorrow would be better. He appears stable to me. He needs to be seen by Bariatric surgeons regarding removal of the VBG (it sounds like he already started paperwork for this but has not been yet).   He will plan to schedule outpatient colonoscopy at his convenience. [facility] will help coordinate.     GI will sign off at this time. Thank you for consulting us on this pleasant patient. Please call if questions arise or the patient's  status changes.         Michael Forte PA-C  [facility]  [phone]  ________________________________________________________________________      SUBJECTIVE:    Overall he feels improved. He still has mild RLQ abdominal pains, worse with position changes and deep palpation. He has diarrhea with loose watery stools. Stool studies pending. C diff negative. No bloody stools or melena.     No nausea. He had vomiting after eating chicken/mac and cheese, this has been a common reoccurrence. Vitals are stable.        OBJECTIVE:  BP 97/56 (BP Location: Left arm)   Pulse 70   Temp 97.8  F (36.6  C) (Oral)   Resp 18   SpO2 95%   Temp (24hrs), Av.7  F (36.5  C), Min:97.6  F (36.4  C), Max:97.8  F (36.6  C)    No data found.    Intake/Output Summary (Last 24 hours) at 2021 0744  Last data filed at 2021 0620  Gross per 24 hour   Intake 4804 ml   Output --   Net 4804 ml        PHYSICAL EXAM  GEN: Alert, oriented x3, communicative and in NAD.    LYMPH: No LAD noted.  HRT: RRR  LUNGS: CTA  ABD: , ND, +BS, + RLQ pain to palpation, no rebound, no HSM.  SKIN: No rash, jaundice or spider angiomata      LABS:  I have reviewed the patient's new clinical lab results:     Recent Labs   Lab Test 21  0854 21  1031   WBC 7.4 8.1   HGB 13.7 14.3   MCV 91 90    186     Recent Labs   Lab Test 21  0612 21  0854 21  2131 21  1031 21  1031   POTASSIUM 3.9 4.2 3.8   < > 4.0   CHLORIDE  --  105  --   --  107   CO2  --  25  --   --  28   BUN  --  11  --   --  16   ANIONGAP  --  11  --   --  5    < > = values in this interval not displayed.     Recent Labs   Lab Test 21  0854 21  1031   ALBUMIN 3.0* 3.2*   BILITOTAL 0.7 0.6   ALT 15 26   AST 15 18       IMAGING:  Reviewed.

## 2021-09-20 NOTE — PLAN OF CARE
Problem: Nausea and Vomiting (Intestinal Obstruction)  Goal: Nausea and Vomiting Relief  Outcome: Improving  Patient denies nausea, does report after every meal that he has some emesis due to food getting stuck. Patient in phone contact with bariatric surgeon to get gastric ring removed.     Problem: Adult Inpatient Plan of Care  Goal: Plan of Care Review  Outcome: Improving  IV fluids discontinued. Bowel sounds active, patient passing flatus, patient reports two or three loose stools this shift. K/Mag protocols with lab rechecks this morning. Stool studies pending.

## 2021-09-20 NOTE — PROGRESS NOTES
"CLINICAL NUTRITION SERVICES - ASSESSMENT NOTE     Nutrition Prescription    RECOMMENDATIONS FOR MDs/PROVIDERS TO ORDER:  If po is <50% at meals offer Ensure Enlive     Malnutrition Status:    Pt does not meet 2 criteria at this time        REASON FOR ASSESSMENT  Dai Izquierdo is a/an 76 year old male assessed by the dietitian for Admission Nutrition Risk Screen     NUTRITION HISTORY  Hx of gastric ring surgery, reflux, wt loss   Attempted to meet with pt with no success. Intake is good     CURRENT NUTRITION ORDERS  Diet: low fiber   Intake/Tolerance: 100%    LABS  Labs reviewed    MEDICATIONS  Medications reviewed    ANTHROPOMETRICS  Height: 5'6\"  Most Recent Weight:   76.2kg   IBW: 59 kg  BMI: Overweight BMI 25-29.9  Weight History:   Wt Readings from Last 5 Encounters:   09/18/21 76.2 kg (168 lb)   10/13/19 wt was 195#  5/18/251 wt was 185 lb  Pt is down 17# (10%) in the past 4 months   Dosing Weight: 59 kg    ASSESSED NUTRITION NEEDS  Estimated Energy Needs: 0553-2411 kcals/day (25 - 30 kcals/kg)  Justification: Maintenance  Estimated Protein Needs: 59-72 grams protein/day (1 - 1.2 grams of pro/kg)  Justification: Maintenance  Estimated Fluid Needs: 0105-2211 mL/day (25 - 30 mL/kg)   Justification: Maintenance      MALNUTRITION  % Intake: Decreased intake does not meet criteria  % Weight Loss: Up to 7.5% in 3 months (non-severe)  Subcutaneous Fat Loss: None observed  Muscle Loss: None observed  Fluid Accumulation/Edema: None noted  Malnutrition Diagnosis: Patient does not meet two of the established criteria necessary for diagnosing malnutrition    NUTRITION DIAGNOSIS  Unintended weight loss related to GI concerns as evidenced by 10% wt loss in 4 months       INTERVENTIONS  Implementation  If po is <50% at meals of Ensure Enlive     Goals  Continue to meet est needs      Monitoring/Evaluation  Progress toward goals will be monitored and evaluated per protocol.  "

## 2021-09-21 VITALS
RESPIRATION RATE: 16 BRPM | OXYGEN SATURATION: 92 % | BODY MASS INDEX: 26.33 KG/M2 | TEMPERATURE: 97.5 F | DIASTOLIC BLOOD PRESSURE: 61 MMHG | WEIGHT: 163.1 LBS | HEART RATE: 69 BPM | SYSTOLIC BLOOD PRESSURE: 125 MMHG

## 2021-09-21 LAB
MAGNESIUM SERPL-MCNC: 1.8 MG/DL (ref 1.8–2.6)
POTASSIUM BLD-SCNC: 4 MMOL/L (ref 3.5–5)

## 2021-09-21 PROCEDURE — 83735 ASSAY OF MAGNESIUM: CPT | Performed by: HOSPITALIST

## 2021-09-21 PROCEDURE — 99238 HOSP IP/OBS DSCHRG MGMT 30/<: CPT | Performed by: INTERNAL MEDICINE

## 2021-09-21 PROCEDURE — 250N000011 HC RX IP 250 OP 636: Performed by: INTERNAL MEDICINE

## 2021-09-21 PROCEDURE — 250N000013 HC RX MED GY IP 250 OP 250 PS 637: Performed by: INTERNAL MEDICINE

## 2021-09-21 PROCEDURE — 84132 ASSAY OF SERUM POTASSIUM: CPT | Performed by: HOSPITALIST

## 2021-09-21 PROCEDURE — 36415 COLL VENOUS BLD VENIPUNCTURE: CPT | Performed by: HOSPITALIST

## 2021-09-21 RX ORDER — PANTOPRAZOLE SODIUM 40 MG/1
40 TABLET, DELAYED RELEASE ORAL DAILY
Qty: 30 TABLET | Refills: 0 | Status: SHIPPED | OUTPATIENT
Start: 2021-09-21

## 2021-09-21 RX ADMIN — UMECLIDINIUM 1 PUFF: 62.5 AEROSOL, POWDER ORAL at 09:33

## 2021-09-21 RX ADMIN — PANTOPRAZOLE SODIUM 40 MG: 20 TABLET, DELAYED RELEASE ORAL at 06:40

## 2021-09-21 RX ADMIN — METRONIDAZOLE 500 MG: 500 INJECTION, SOLUTION INTRAVENOUS at 09:33

## 2021-09-21 ASSESSMENT — ACTIVITIES OF DAILY LIVING (ADL): DEPENDENT_IADLS:: INDEPENDENT

## 2021-09-21 NOTE — PROGRESS NOTES
Care Management Discharge Note    Discharge Date: 09/21/2021  Expected Time of Departure: 1230 hrs    Discharge Disposition: Home    Discharge Services: None    Discharge DME: None    Discharge Transportation: family or friend will provide    Private pay costs discussed: Not applicable    Education Provided on the Discharge Plan:  Yes  Persons Notified of Discharge Plans: Pt.  Patient/Family in Agreement with the Plan: yes    Handoff Referral Completed: N/A    Additional Information:  Writer met with pt to obtain a discharge assessment as pt is discharging today.    Pt reports being totally independent with ADLs at baseline. He intends on transitioning to his nephew's home for a few days to make sure he is well enough to return to his own home.    Pt denies the need for provision of DME or involvement of any in-home services.    Nephew to transport at 1230 hrs.    No further CM involvement needed.    Long Roach RN

## 2021-09-21 NOTE — DISCHARGE SUMMARY
[facility] MEDICINE  DISCHARGE SUMMARY     Primary Care Physician: Saeid Mehta  Admission Date: 9/18/2021   Discharge Provider: Michelle Lowe MD Discharge Date: 9/21/2021   Diet:   Active Diet and Nourishment Order   Procedures     Low Fiber Diet     Diet       Code Status: Full Code   Activity: DCACTIVITY: Activity as tolerated        Condition at Discharge: Good     REASON FOR PRESENTATION(See Admission Note for Details)   Abdominal pain, diarrhea    PRINCIPAL & ACTIVE DISCHARGE DIAGNOSES     Probable infectious gastroenteritis  Possible gastritis  Essential hypertension  GERD  History of stroke    PENDING LABS     Unresulted Labs Ordered in the Past 30 Days of this Admission     No orders found from 8/19/2021 to 9/19/2021.            PROCEDURES ( this hospitalization only)          RECOMMENDATIONS TO OUTPATIENT PROVIDER FOR F/U VISIT     Follow-up Appointments     Follow-up and recommended labs and tests       Follow up with primary care provider, Saeid Mehta, within 7 days for   hospital follow- up.    Outpatient Colonoscopy in 1-2 months             DISPOSITION     Home    SUMMARY OF HOSPITAL COURSE:      Dai Izquierdo is a 76 year old old male with history of diverticulitis pulmonary fibrosis, and perforated colon in the past, status post vertical band gastroplasty 1995, presented with abdominal pain, diarrhea that has been worsening associated with occasional diarrhea over the past week.  CT abdomen with mildly thickened terminal ileum, surrounding inflammatory stranding consistent with terminal ileitis, some degree of bowel obstruction.     Has medical history of reflux, diverticulitis, hypertension, CVA, COPD, weight loss surgery, prior colon rupture.  As an outpatient takes amlodipine, Lipitor, Plavix, Levaquin, Protonix, Terazosin, inhaler.     GI consulted secondary to terminal ileitis.  Probably infectious in origin.  Bacterial/viral stool panel was negative,  ova and parasites were negative.  Stools are more solid consistency now.  Diarrhea is resolved.  No need for further metronidazole therapy.  Outpatient colonoscopy in 1 to 2 months.  Patient scheduled to have gastric ring removed in future  Discharge Medications with Med changes:     Current Discharge Medication List      START taking these medications    Details   !! pantoprazole (PROTONIX) 40 MG EC tablet Take 1 tablet (40 mg) by mouth daily  Qty: 30 tablet, Refills: 0    Associated Diagnoses: Gastritis without bleeding, unspecified chronicity, unspecified gastritis type       !! - Potential duplicate medications found. Please discuss with provider.      CONTINUE these medications which have NOT CHANGED    Details   acetaminophen (TYLENOL) 500 MG tablet Take 500 mg by mouth every 6 hours as needed      acetaminophen-codeine (TYLENOL #3) 300-30 MG tablet Take 1 tablet by mouth daily as needed for pain In right shoulder      albuterol (PROAIR HFA/PROVENTIL HFA/VENTOLIN HFA) 108 (90 Base) MCG/ACT inhaler Inhale 1-2 puffs into the lungs every 6 hours as needed      amLODIPine (NORVASC) 5 MG tablet Take 1 tablet by mouth every evening       atorvastatin (LIPITOR) 10 MG tablet Take 1 tablet by mouth every evening       cholecalciferol (VITAMIN D3) 125 mcg (5000 units) capsule Take 125 mcg by mouth every evening       cyanocobalamin (VITAMIN B-12) 1000 MCG tablet Take 1 tablet by mouth every evening       doxycycline monohydrate (MONODOX) 100 MG capsule Take 1 capsule by mouth 2 times daily if needed for lung symptoms. x10 days      Ferrous Sulfate (IRON) 325 (65 Fe) MG tablet Take 1 tablet by mouth every evening      INCRUSE ELLIPTA 62.5 MCG/INH Inhaler Inhale 1 puff into the lungs daily      multivitamin w/minerals (THERA-VIT-M) tablet Take 1 tablet by mouth every evening      !! pantoprazole (PROTONIX) 40 MG EC tablet Take 40 mg by mouth 2 times daily       potassium 99 MG TABS Take 1 tablet by mouth every evening        predniSONE (DELTASONE) 20 MG tablet Take 2 tablets by mouth daily as needed Keep on hand if needed for lung symptoms. Take for 5 days.      terazosin (HYTRIN) 5 MG capsule Take 1 capsule by mouth At Bedtime      clopidogrel (PLAVIX) 75 MG tablet Take 1 tablet by mouth daily        !! - Potential duplicate medications found. Please discuss with provider.      STOP taking these medications       levofloxacin (LEVAQUIN) 500 MG tablet Comments:   Reason for Stopping:                     Rationale for medication changes:      Levaquin discontinued as may cause worsening diarrhea/recurrence of diarrhea        Consults       GASTROENTEROLOGY IP CONSULT    Immunizations given this encounter       There is no immunization history on file for this patient.        Anticoagulation Information      Recent INR results: No results for input(s): INR in the last 168 hours.  Warfarin doses (if applicable) or name of other anticoagulant:       SIGNIFICANT IMAGING FINDINGS     No results found for this visit on 09/18/21.    SIGNIFICANT LABORATORY FINDINGS     Most Recent 3 CBC's:Recent Labs   Lab Test 09/19/21  0854 09/18/21  1031   WBC 7.4 8.1   HGB 13.7 14.3   MCV 91 90    186     Most Recent 3 BMP's:Recent Labs   Lab Test 09/21/21  0741 09/20/21  0612 09/19/21  0854 09/18/21  2131 09/18/21  1031   NA  --   --  141  --  140   POTASSIUM 4.0 3.9 4.2   < > 4.0   CHLORIDE  --   --  105  --  107   CO2  --   --  25  --  28   BUN  --   --  11  --  16   CR  --   --  1.03  --  1.13   ANIONGAP  --   --  11  --  5   KATHERINE  --   --  8.6  --  8.5   GLC  --   --  78  --  103*    < > = values in this interval not displayed.     Most Recent 6 Bacteria Isolates From Any Culture (See EPIC Reports for Culture Details):No lab results found.    1252 09/20/2021 1049 Enteric Bacteria and Virus Panel by MARGA Stool [[id number]]    Stool from Per Rectum    Final result Component Value   Campylobacter group Not Detected   Salmonella species Not  Detected   Shigella species Not Detected   Vibrio group Not Detected   Rotavirus Not Detected   Shiga toxin 1 gene Not Detected   Shiga toxin 2 gene Not Detected   Norovirus I and II Not Detected   Yersinia enterocolitica Not Detected        C diff negative        Discharge Orders        Reason for your hospital stay    Abdominal pain and diarrhea     Follow-up and recommended labs and tests     Follow up with primary care provider, Saeid Mehta, within 7 days for hospital follow- up.    Outpatient Colonoscopy in 1-2 months     Activity    Your activity upon discharge: activity as tolerated     Diet    Follow this diet upon discharge: Orders Placed This Encounter      Low Fiber Diet       Examination   Physical Exam   Temp:  [97.5  F (36.4  C)-98.4  F (36.9  C)] 97.5  F (36.4  C)  Pulse:  [65-79] 69  Resp:  [16-18] 16  BP: (117-135)/(58-72) 125/61  SpO2:  [91 %-93 %] 92 %  Wt Readings from Last 1 Encounters:   09/21/21 74 kg (163 lb 1.6 oz)       General: Awake alert and comfortable  Lungs: CTA without rales or rhonchi  Heart: S1, S2 without murmurs  Abdomen: Soft nontender, bowel sounds positive  CNS: Nonfocal  Extremities: No edema        Please see EMR for more detailed significant labs, imaging, consultant notes etc.    IMichelle MD, personally saw the patient today and spent less than or equal to 30 minutes discharging this patient.    Michelle Lowe MD  [facility]    CC:Saeid Mehta

## 2021-09-21 NOTE — PLAN OF CARE
Problem: Adult Inpatient Plan of Care  Goal: Plan of Care Review  Outcome: Improving  Problem: Pain (Intestinal Obstruction)  Goal: Acceptable Pain Control  Outcome: Improving  Pt pleasant and cooperative to cares, denies p/n/v throughout the shift, pt ambulated independently, assisted as needed, K+ and mg+ recheck tomorrow, continue to monitor pt status.

## 2021-09-21 NOTE — PLAN OF CARE
Pt adequate for discharge.     Peripheral IV removed. All belongings returned and with Pt. AVS reviewed and signed. Questions answered.

## 2021-09-21 NOTE — PLAN OF CARE
Problem: Adult Inpatient Plan of Care  Goal: Plan of Care Review  Outcome: Improving     Problem: Pain (Intestinal Obstruction)  Goal: Acceptable Pain Control  Outcome: Improving     Problem: Infection (Intestinal Obstruction)  Goal: Absence of Infection Signs and Symptoms  Outcome: Improving       VSS. No chest pains or resp distress noted. CPAP in use. Self-managed.  Denies pain or discomfort.   Independent in room. No dizziness or lightheadedness noted.  CMS and neuros intact.  Continues on IV Flagyl  Continues on K and Mag protocols.  Tolerating diet with no episodes of n/v. Passing gas and had 1 BM within the shift.

## 2021-09-29 ENCOUNTER — TELEPHONE (OUTPATIENT)
Dept: ENDOCRINOLOGY | Facility: CLINIC | Age: 76
End: 2021-09-29

## 2021-09-29 NOTE — TELEPHONE ENCOUNTER
Called patient at the request of Dr. Castro to schedule upper endoscopy w/stent placement and upper endoscopy w/stent removal 3-4 weeks later.  Patient was referred by Dr. Cadena [place]. Scheduled in-person clinic appointment on 10/7 at 11 a.m., asks if someone cancels if he can get in sooner.

## 2021-10-07 ENCOUNTER — OFFICE VISIT (OUTPATIENT)
Dept: ENDOCRINOLOGY | Facility: CLINIC | Age: 76
End: 2021-10-07
Payer: COMMERCIAL

## 2021-10-07 ENCOUNTER — PREP FOR PROCEDURE (OUTPATIENT)
Dept: SURGERY | Facility: CLINIC | Age: 76
End: 2021-10-07

## 2021-10-07 VITALS
BODY MASS INDEX: 26.89 KG/M2 | HEART RATE: 84 BPM | OXYGEN SATURATION: 97 % | DIASTOLIC BLOOD PRESSURE: 70 MMHG | WEIGHT: 167.3 LBS | HEIGHT: 66 IN | SYSTOLIC BLOOD PRESSURE: 124 MMHG

## 2021-10-07 DIAGNOSIS — R13.19 ESOPHAGEAL DYSPHAGIA: Primary | ICD-10-CM

## 2021-10-07 PROCEDURE — 99203 OFFICE O/P NEW LOW 30 MIN: CPT | Performed by: SURGERY

## 2021-10-07 ASSESSMENT — PAIN SCALES - GENERAL: PAINLEVEL: NO PAIN (0)

## 2021-10-07 ASSESSMENT — MIFFLIN-ST. JEOR: SCORE: 1441.62

## 2021-10-07 NOTE — NURSING NOTE
"Chief Complaint   Patient presents with     Follow Up     Follow-up Gastric outlet Obstruction Stent placement with upper Endoscopy       Vitals:    10/07/21 0855   BP: 124/70   BP Location: Left arm   Patient Position: Chair   Cuff Size: Adult Regular   Pulse: 84   SpO2: 97%   Weight: 75.9 kg (167 lb 4.8 oz)   Height: 1.676 m (5' 6\")       Body mass index is 27 kg/m .                            "

## 2021-10-07 NOTE — LETTER
"10/7/2021       RE: Dai Izquierdo  [address] [facility] [address]     Dear Colleague,    Thank you for referring your patient, Dai Izquierdo, to the [facility] [place] [facility]. Please see a copy of my visit note below.     New Bariatric Surgery Re-Establish Care/Referral Consultation      RE: Dai Izquierdo  MR#: [id number]  : 1945  LAST: [unfilled]      Requesting provider: Selvin Cadena    Chief Complaint/Reason for visit: evaluation for dysphagia; regurgitation; obstructions at gastric foreign object.    Dear Dr. Mehta,    I had the pleasure of seeing your patient, Dai Izquierdo, today in my bariatric surgery clinic at the request of Dr. Sebas Cadena specifically to evaluate and treat gastric stricture using esophageal stent placement.      As you know, Dai Izquierdo is 76 year old.  He has a height of 5' 6\", a weight of 167 lbs 4.8 oz, and calculated Body mass index is 27 kg/m ..  He has a history of weight loss surgery, having undergone vertical banded gastroplasty by Dr. Vinay Villafuerte in .  He has had durable long-term weight loss from the operation, but has had innumerable obstructions of the VBG outlet.  Most recently Dr. Cadena has evaluated Dai to manage gastric stricture.    Details of prior surgery: OPEN VBG by Dr. Villafuerte in  at a weight greater than 300 pounds. 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     Recent upper endoscopy documented polyps/small hiatal hernia and VBG outlet which was traversed with scope, but was tight; VBG ring not seen on endoscopy.    Had ruptured sigmoid colon  with subsequent colostomy takedown and then hernia repair.    Dai has significant forced maladaptive eating and regurgitation with any solid foods presently; he mainly avoids solid food.  .  History as follows reviewed:    Patient Active Problem " List   Diagnosis Code     Unspecified cerebral artery occlusion with cerebral infarction I63.50     Unspecified essential hypertension I10     Syncope and collapse R55     Dizziness and giddiness R42     Aneurysm of splenic artery (HC) I72.8     Chronic pain of both shoulders M25.511, G89.29, M25.512     History of left shoulder open rotator cuff repair/acromioplasty, acromioclavicular joint resection, subscapularis repair, biceps tenodesis, coracoid decompression on 12/19/2000 by TEENA Garcia MD Z98.890     History of right shoulder arthroscopic surgery Z98.890     Rotator cuff tear arthropathy of right shoulder M75.101, M12.811     Osteoarthritis of left glenohumeral joint M19.012     Past Medical History:   . 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 Laterality Date     APPENDECTOMY     COLECTOMY     COLOSTOMY 6/2/08     gastric ring 93     HIP REPLACEMENT   bilateral     LAPAROTOMY 6/2/08     left inguinal hernia 05/2020     orif femur   left     ROTATOR CUFF REPAIR 1990 ,95,2000   both shoulders 3 procedures       No flowsheet data found.    No past medical history on file.    No past surgical history on file.    FAMILY HISTORY:   No family history on file.    SOCIAL HISTORY:   No flowsheet data found.    PSYCHOLOGICAL HISTORY:   No flowsheet data found.    ROS    MEDICATIONS:  Current Outpatient Medications   Medication     acetaminophen (TYLENOL) 500 MG tablet     albuterol (PROAIR HFA/PROVENTIL HFA/VENTOLIN HFA) 108 (90 Base) MCG/ACT inhaler     amLODIPine (NORVASC) 5 MG tablet     atorvastatin (LIPITOR) 10 MG tablet     cholecalciferol (VITAMIN D3) 125 mcg (5000 units) capsule      "clopidogrel (PLAVIX) 75 MG tablet     cyanocobalamin (VITAMIN B-12) 1000 MCG tablet     Ferrous Sulfate (IRON) 325 (65 Fe) MG tablet     INCRUSE ELLIPTA 62.5 MCG/INH Inhaler     multivitamin w/minerals (THERA-VIT-M) tablet     pantoprazole (PROTONIX) 40 MG EC tablet     potassium 99 MG TABS     predniSONE (DELTASONE) 20 MG tablet     terazosin (HYTRIN) 5 MG capsule     acetaminophen-codeine (TYLENOL #3) 300-30 MG tablet     doxycycline monohydrate (MONODOX) 100 MG capsule     No current facility-administered medications for this visit.       ALLERGIES:  No Known Allergies    /70 (BP Location: Left arm, Patient Position: Chair, Cuff Size: Adult Regular)   Pulse 84   Ht 1.676 m (5' 6\")   Wt 75.9 kg (167 lb 4.8 oz)   SpO2 97%   BMI 27.00 kg/m      PHYSICAL EXAM:  Neurological: A & O x 3  Eyes: PERRL  ENT: mucous membranes moist  Skin: warm and dry  Musculoskeletal: gait intact  Respiratory: Respirations unlabored  Abdomen: obese soft NT ND; incisions from prior laparotomies, hernia repair    In summary, Dai Izquierdo is a 76 year old male who has a history of VBG with VBG outlet stricture who is interested in removal of VBG ring and we will do this using esophageal stent migration technique.  Stent for 3-4 weeks followed by removal.    Patient aware that this will remove ring, though scar tissue can still sometimes create swallowing troubles; however, then opening can be more successfully dilated.      PLAN:    Upper endoscopy with stent placement.  Upper endoscopy with stent removal 3-4 weeks later.  PAC or primary for H&P; general anesthesia or propofol.    Labs  No orders of the defined types were placed in this encounter.    Imaging reviewed: see below.      FOLLOW-UP:     At endoscopy.        Sincerely,    Timothy Castro MD    I spent a total of 30 minutes face to face with Dai Izquierdo during today's office visit.  Over 50% of this time was spent counseling the patient and/or coordinating " care.            Study Result    Narrative & Impression   CT ABDOMEN/PELVIS WITH CONTRAST September 18, 2021 11:21 AM     CLINICAL HISTORY: Nausea and vomiting.     TECHNIQUE: CT scan of the abdomen and pelvis was performed following  injection of IV contrast. Multiplanar reformats were obtained. Dose  reduction techniques were used.  CONTRAST: 82 mL Isovue 370.     COMPARISON: None.     FINDINGS:   LOWER CHEST: Mild to moderate fibrotic changes are noted at both lung  bases. Small hiatal hernia. Coronary artery calcification.     HEPATOBILIARY: A small calcified gallstone is noted within the  gallbladder. No hepatic masses are identified.     PANCREAS: Normal.     SPLEEN: Normal.     ADRENAL GLANDS: Normal.     KIDNEYS/BLADDER: Unremarkable. No hydronephrosis.     BOWEL: Postoperative changes are noted in the rectosigmoid region.  There is an approximately 25 cm segment of mildly thickened terminal  ileum, with mild associated inflammatory stranding. Proximal to this  thickened segment of terminal ileum, there are multiple loops of  dilated fluid-filled small bowel, suggesting some degree of associated  small bowel obstruction. Unremarkable appendix.     PELVIC ORGANS: Multiple images through the pelvis are severely  degraded due to artifact from bilateral hip arthroplasties.     LYMPH NODES: No enlarged lymph nodes are identified in the abdomen or  pelvis.     VASCULATURE: Moderate atherosclerotic aortoiliac calcification.  Prominent metallic artifact in the left upper quadrant may represent  vascular coils, possibly within a splenic artery or renal artery  aneurysm.     ADDITIONAL FINDINGS: Trace amount of mesenteric fluid is noted within  the right lower quadrant. There are several small fat-containing  ventral hernias in the supraumbilical region.     MUSCULOSKELETAL: Unremarkable.                                                                      IMPRESSION:   1.  Mildly thickened approximately 25 cm segment  of terminal ileum  with mild surrounding inflammatory stranding, consistent with terminal  ileitis, most likely infectious or inflammatory in etiology.  2.  Proximal to this thickened segment of terminal ileum, there are  multiple loops of dilated fluid-filled small bowel, suggesting some  degree of associated small bowel obstruction.  3.  Cholelithiasis.     DANA DMAON MD

## 2021-10-07 NOTE — PROGRESS NOTES
" New Bariatric Surgery Re-Establish Care/Referral Consultation      RE: Dai Izquierdo  MR#: [id number]  : 1945  LAST: [unfilled]      Requesting provider: Selvin Cadena    Chief Complaint/Reason for visit: evaluation for dysphagia; regurgitation; obstructions at gastric foreign object.    Dear Dr. Mehta,    I had the pleasure of seeing your patient, Dai Izquierdo, today in my bariatric surgery clinic at the request of Dr. Sebas Cadena specifically to evaluate and treat gastric stricture using esophageal stent placement.      As you know, Dai Izquierdo is 76 year old.  He has a height of 5' 6\", a weight of 167 lbs 4.8 oz, and calculated Body mass index is 27 kg/m ..  He has a history of weight loss surgery, having undergone vertical banded gastroplasty by Dr. Vinay Villafuerte in .  He has had durable long-term weight loss from the operation, but has had innumerable obstructions of the VBG outlet.  Most recently Dr. Cadena has evaluated Dai to manage gastric stricture.    Details of prior surgery: OPEN VBG by Dr. Villafuerte in  at a weight greater than 300 pounds. 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     Recent upper endoscopy documented polyps/small hiatal hernia and VBG outlet which was traversed with scope, but was tight; VBG ring not seen on endoscopy.    Had ruptured sigmoid colon  with subsequent colostomy takedown and then hernia repair.    Dai has significant forced maladaptive eating and regurgitation with any solid foods presently; he mainly avoids solid food.  .  History as follows reviewed:    Patient Active Problem List   Diagnosis Code     Unspecified cerebral artery occlusion with cerebral infarction I63.50     Unspecified essential hypertension I10     Syncope and collapse R55     Dizziness and giddiness R42     Aneurysm of splenic artery (HC) I72.8     Chronic pain of both shoulders M25.511, G89.29, M25.512     History of left shoulder " open rotator cuff repair/acromioplasty, acromioclavicular joint resection, subscapularis repair, biceps tenodesis, coracoid decompression on 12/19/2000 by TEENA Garcia MD Z98.890     History of right shoulder arthroscopic surgery Z98.890     Rotator cuff tear arthropathy of right shoulder M75.101, M12.811     Osteoarthritis of left glenohumeral joint M19.012     Past Medical History:   . 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 Laterality Date     APPENDECTOMY     COLECTOMY     COLOSTOMY 6/2/08     gastric ring 93     HIP REPLACEMENT   bilateral     LAPAROTOMY 6/2/08     left inguinal hernia 05/2020     orif femur   left     ROTATOR CUFF REPAIR 1990 ,95,2000   both shoulders 3 procedures       No flowsheet data found.    No past medical history on file.    No past surgical history on file.    FAMILY HISTORY:   No family history on file.    SOCIAL HISTORY:   No flowsheet data found.    PSYCHOLOGICAL HISTORY:   No flowsheet data found.    ROS    MEDICATIONS:  Current Outpatient Medications   Medication     acetaminophen (TYLENOL) 500 MG tablet     albuterol (PROAIR HFA/PROVENTIL HFA/VENTOLIN HFA) 108 (90 Base) MCG/ACT inhaler     amLODIPine (NORVASC) 5 MG tablet     atorvastatin (LIPITOR) 10 MG tablet     cholecalciferol (VITAMIN D3) 125 mcg (5000 units) capsule     clopidogrel (PLAVIX) 75 MG tablet     cyanocobalamin (VITAMIN B-12) 1000 MCG tablet     Ferrous Sulfate (IRON) 325 (65 Fe) MG tablet     INCRUSE ELLIPTA 62.5 MCG/INH Inhaler     multivitamin w/minerals (THERA-VIT-M) tablet     pantoprazole (PROTONIX) 40 MG EC tablet     potassium 99 MG TABS     predniSONE (DELTASONE) 20 MG tablet  "    terazosin (HYTRIN) 5 MG capsule     acetaminophen-codeine (TYLENOL #3) 300-30 MG tablet     doxycycline monohydrate (MONODOX) 100 MG capsule     No current facility-administered medications for this visit.       ALLERGIES:  No Known Allergies    /70 (BP Location: Left arm, Patient Position: Chair, Cuff Size: Adult Regular)   Pulse 84   Ht 1.676 m (5' 6\")   Wt 75.9 kg (167 lb 4.8 oz)   SpO2 97%   BMI 27.00 kg/m      PHYSICAL EXAM:  Neurological: A & O x 3  Eyes: PERRL  ENT: mucous membranes moist  Skin: warm and dry  Musculoskeletal: gait intact  Respiratory: Respirations unlabored  Abdomen: obese soft NT ND; incisions from prior laparotomies, hernia repair    In summary, Dai Izquierdo is a 76 year old male who has a history of VBG with VBG outlet stricture who is interested in removal of VBG ring and we will do this using esophageal stent migration technique.  Stent for 3-4 weeks followed by removal.    Patient aware that this will remove ring, though scar tissue can still sometimes create swallowing troubles; however, then opening can be more successfully dilated.      PLAN:    Upper endoscopy with stent placement.  Upper endoscopy with stent removal 3-4 weeks later.  PAC or primary for H&P; general anesthesia or propofol.    Labs  No orders of the defined types were placed in this encounter.    Imaging reviewed: see below.      FOLLOW-UP:     At endoscopy.        Sincerely,    Timothy Castro MD    I spent a total of 30 minutes face to face with Dai Izquierdo during today's office visit.  Over 50% of this time was spent counseling the patient and/or coordinating care.            Study Result    Narrative & Impression   CT ABDOMEN/PELVIS WITH CONTRAST September 18, 2021 11:21 AM     CLINICAL HISTORY: Nausea and vomiting.     TECHNIQUE: CT scan of the abdomen and pelvis was performed following  injection of IV contrast. Multiplanar reformats were obtained. Dose  reduction techniques were " used.  CONTRAST: 82 mL Isovue 370.     COMPARISON: None.     FINDINGS:   LOWER CHEST: Mild to moderate fibrotic changes are noted at both lung  bases. Small hiatal hernia. Coronary artery calcification.     HEPATOBILIARY: A small calcified gallstone is noted within the  gallbladder. No hepatic masses are identified.     PANCREAS: Normal.     SPLEEN: Normal.     ADRENAL GLANDS: Normal.     KIDNEYS/BLADDER: Unremarkable. No hydronephrosis.     BOWEL: Postoperative changes are noted in the rectosigmoid region.  There is an approximately 25 cm segment of mildly thickened terminal  ileum, with mild associated inflammatory stranding. Proximal to this  thickened segment of terminal ileum, there are multiple loops of  dilated fluid-filled small bowel, suggesting some degree of associated  small bowel obstruction. Unremarkable appendix.     PELVIC ORGANS: Multiple images through the pelvis are severely  degraded due to artifact from bilateral hip arthroplasties.     LYMPH NODES: No enlarged lymph nodes are identified in the abdomen or  pelvis.     VASCULATURE: Moderate atherosclerotic aortoiliac calcification.  Prominent metallic artifact in the left upper quadrant may represent  vascular coils, possibly within a splenic artery or renal artery  aneurysm.     ADDITIONAL FINDINGS: Trace amount of mesenteric fluid is noted within  the right lower quadrant. There are several small fat-containing  ventral hernias in the supraumbilical region.     MUSCULOSKELETAL: Unremarkable.                                                                      IMPRESSION:   1.  Mildly thickened approximately 25 cm segment of terminal ileum  with mild surrounding inflammatory stranding, consistent with terminal  ileitis, most likely infectious or inflammatory in etiology.  2.  Proximal to this thickened segment of terminal ileum, there are  multiple loops of dilated fluid-filled small bowel, suggesting some  degree of associated small bowel  obstruction.  3.  Cholelithiasis.     DANA DAMON MD

## 2021-10-08 ENCOUNTER — TELEPHONE (OUTPATIENT)
Dept: ENDOCRINOLOGY | Facility: CLINIC | Age: 76
End: 2021-10-08

## 2021-10-08 NOTE — TELEPHONE ENCOUNTER
Patient called to discuss preoperative H&P. He can see PAC or his primary care. He will have the H&P in [facility] on 10/12/21. He has a COVID-19 test on file and will have testing done the same day.  Scheduled for upper endoscopy with stent placement on 10/15/21 with Dr. Castro. Told patient I would work on a day approximately 3-4 weeks out from placement for removal of the stent.

## 2021-10-12 ENCOUNTER — TELEPHONE (OUTPATIENT)
Dept: SURGERY | Facility: CLINIC | Age: 76
End: 2021-10-12

## 2021-10-12 ENCOUNTER — TELEPHONE (OUTPATIENT)
Dept: ENDOCRINOLOGY | Facility: CLINIC | Age: 76
End: 2021-10-12

## 2021-10-12 NOTE — TELEPHONE ENCOUNTER
Patient called back stating he had received all my previous phone calls and didn't understand the confusion with stating I was not returning his calls.

## 2021-10-12 NOTE — TELEPHONE ENCOUNTER
Clarified preop instructions for his upcoming procedure.  Patient is awaiting to hear back on the scheduled day for the stent removal.    Patient to be NP after midnight.  Is not planning on taking medications the day of procedure as he said the last time the pills were still in there and they couldn't fully do his EGD.  Patient stopped Plavix 7 days before procedure.

## 2021-10-12 NOTE — TELEPHONE ENCOUNTER
Left another voicemail message as patient has questions prior to stent placement on 10/15. He had questions about stopping a certain medication which I could not answer. I did ask him to ask his primary care provider or his cardiologist about when to stop the medication. I did give him the arrival time for the procedure and case start time. He wanted to know about eating prior to the procedure and I said pretty much NPO after midnight but if his doctor wanted him to take certain medications the day of surgery, he could do this with sips of water. Patient has my call back number. All questions he has asked have been answered x2. He is having pre-op H&P with his primary care.

## 2021-10-12 NOTE — TELEPHONE ENCOUNTER
Received prior authorization from  for VBG ring removal ([id number]), auth ref # [id number], effective 10/07/21 - 10/06/22.

## 2021-10-12 NOTE — TELEPHONE ENCOUNTER
[facility] Call Center    Phone Message    May a detailed message be left on voicemail: yes     Reason for Call: Other: Dai is calling in asking for a call back. He states that he has a procedure scheduled on 10/15 with Dr. Castro, but has not received all the information he needs to prep for his procedure. He states that he has attempted to call Reinier a few times and has been unable to reach her. Please call back as soon as possible to discuss.     Action Taken: Message routed to:  [facility] Gen Surg    Travel Screening: Not Applicable

## 2021-10-13 ENCOUNTER — TELEPHONE (OUTPATIENT)
Dept: ENDOCRINOLOGY | Facility: CLINIC | Age: 76
End: 2021-10-13

## 2021-10-13 NOTE — TELEPHONE ENCOUNTER
Called patient to let him know his surgery has been moved up to 8:45 a.m. from 10:10 as a case prior to his has fallen off. To arrive at the hospital 6:45 a.m.

## 2021-10-14 ENCOUNTER — ANESTHESIA EVENT (OUTPATIENT)
Dept: SURGERY | Facility: CLINIC | Age: 76
End: 2021-10-14
Payer: COMMERCIAL

## 2021-10-14 ASSESSMENT — COPD QUESTIONNAIRES: COPD: 1

## 2021-10-15 ENCOUNTER — APPOINTMENT (OUTPATIENT)
Dept: GENERAL RADIOLOGY | Facility: CLINIC | Age: 76
End: 2021-10-15
Attending: SURGERY
Payer: COMMERCIAL

## 2021-10-15 ENCOUNTER — HOSPITAL ENCOUNTER (OUTPATIENT)
Facility: CLINIC | Age: 76
Discharge: HOME | End: 2021-10-15
Attending: SURGERY | Admitting: SURGERY
Payer: COMMERCIAL

## 2021-10-15 ENCOUNTER — ANESTHESIA (OUTPATIENT)
Dept: SURGERY | Facility: CLINIC | Age: 76
End: 2021-10-15
Payer: COMMERCIAL

## 2021-10-15 VITALS
TEMPERATURE: 97 F | DIASTOLIC BLOOD PRESSURE: 63 MMHG | RESPIRATION RATE: 16 BRPM | HEIGHT: 66 IN | HEART RATE: 68 BPM | BODY MASS INDEX: 26.93 KG/M2 | WEIGHT: 167.55 LBS | SYSTOLIC BLOOD PRESSURE: 140 MMHG | OXYGEN SATURATION: 96 %

## 2021-10-15 DIAGNOSIS — R13.19 ESOPHAGEAL DYSPHAGIA: ICD-10-CM

## 2021-10-15 LAB — GLUCOSE BLDC GLUCOMTR-MCNC: 77 MG/DL (ref 70–99)

## 2021-10-15 PROCEDURE — 250N000025 HC SEVOFLURANE, PER MIN: Performed by: SURGERY

## 2021-10-15 PROCEDURE — C1769 GUIDE WIRE: HCPCS | Performed by: SURGERY

## 2021-10-15 PROCEDURE — 74360 X-RAY GUIDE GI DILATION: CPT | Mod: 26 | Performed by: SURGERY

## 2021-10-15 PROCEDURE — 999N000141 HC STATISTIC PRE-PROCEDURE NURSING ASSESSMENT: Performed by: SURGERY

## 2021-10-15 PROCEDURE — C1874 STENT, COATED/COV W/DEL SYS: HCPCS | Performed by: SURGERY

## 2021-10-15 PROCEDURE — 710N000010 HC RECOVERY PHASE 1, LEVEL 2, PER MIN: Performed by: SURGERY

## 2021-10-15 PROCEDURE — 370N000017 HC ANESTHESIA TECHNICAL FEE, PER MIN: Performed by: SURGERY

## 2021-10-15 PROCEDURE — 250N000011 HC RX IP 250 OP 636

## 2021-10-15 PROCEDURE — 82962 GLUCOSE BLOOD TEST: CPT

## 2021-10-15 PROCEDURE — 360N000082 HC SURGERY LEVEL 2 W/ FLUORO, PER MIN: Performed by: SURGERY

## 2021-10-15 PROCEDURE — 43266 EGD ENDOSCOPIC STENT PLACE: CPT | Mod: GC | Performed by: SURGERY

## 2021-10-15 PROCEDURE — 250N000009 HC RX 250

## 2021-10-15 PROCEDURE — 710N000012 HC RECOVERY PHASE 2, PER MINUTE: Performed by: SURGERY

## 2021-10-15 PROCEDURE — 272N000001 HC OR GENERAL SUPPLY STERILE: Performed by: SURGERY

## 2021-10-15 PROCEDURE — 258N000003 HC RX IP 258 OP 636

## 2021-10-15 PROCEDURE — 999N000179 XR SURGERY CARM FLUORO LESS THAN 5 MIN W STILLS: Mod: TC

## 2021-10-15 DEVICE — STENT ESOPHAGEAL ENDOMAXX 23X70MM MAXX-237
Type: IMPLANTABLE DEVICE | Site: STOMACH | Status: NON-FUNCTIONAL
Removed: 2021-11-10

## 2021-10-15 RX ORDER — FENTANYL CITRATE 50 UG/ML
25 INJECTION, SOLUTION INTRAMUSCULAR; INTRAVENOUS EVERY 5 MIN PRN
Status: CANCELLED | OUTPATIENT
Start: 2021-10-15

## 2021-10-15 RX ORDER — HYDRALAZINE HYDROCHLORIDE 20 MG/ML
2.5-5 INJECTION INTRAMUSCULAR; INTRAVENOUS EVERY 10 MIN PRN
Status: CANCELLED | OUTPATIENT
Start: 2021-10-15

## 2021-10-15 RX ORDER — FENTANYL CITRATE 50 UG/ML
INJECTION, SOLUTION INTRAMUSCULAR; INTRAVENOUS PRN
Status: DISCONTINUED | OUTPATIENT
Start: 2021-10-15 | End: 2021-10-15

## 2021-10-15 RX ORDER — SODIUM CHLORIDE, SODIUM LACTATE, POTASSIUM CHLORIDE, CALCIUM CHLORIDE 600; 310; 30; 20 MG/100ML; MG/100ML; MG/100ML; MG/100ML
INJECTION, SOLUTION INTRAVENOUS CONTINUOUS PRN
Status: DISCONTINUED | OUTPATIENT
Start: 2021-10-15 | End: 2021-10-15

## 2021-10-15 RX ORDER — LABETALOL HYDROCHLORIDE 5 MG/ML
10 INJECTION, SOLUTION INTRAVENOUS
Status: DISCONTINUED | OUTPATIENT
Start: 2021-10-15 | End: 2021-10-15 | Stop reason: HOSPADM

## 2021-10-15 RX ORDER — ONDANSETRON 2 MG/ML
INJECTION INTRAMUSCULAR; INTRAVENOUS PRN
Status: DISCONTINUED | OUTPATIENT
Start: 2021-10-15 | End: 2021-10-15

## 2021-10-15 RX ORDER — DEXAMETHASONE SODIUM PHOSPHATE 4 MG/ML
INJECTION, SOLUTION INTRA-ARTICULAR; INTRALESIONAL; INTRAMUSCULAR; INTRAVENOUS; SOFT TISSUE PRN
Status: DISCONTINUED | OUTPATIENT
Start: 2021-10-15 | End: 2021-10-15

## 2021-10-15 RX ORDER — FENTANYL CITRATE 50 UG/ML
25 INJECTION, SOLUTION INTRAMUSCULAR; INTRAVENOUS
Status: DISCONTINUED | OUTPATIENT
Start: 2021-10-15 | End: 2021-10-15 | Stop reason: HOSPADM

## 2021-10-15 RX ORDER — ONDANSETRON 4 MG/1
4 TABLET, ORALLY DISINTEGRATING ORAL EVERY 30 MIN PRN
Status: CANCELLED | OUTPATIENT
Start: 2021-10-15

## 2021-10-15 RX ORDER — SODIUM CHLORIDE, SODIUM LACTATE, POTASSIUM CHLORIDE, CALCIUM CHLORIDE 600; 310; 30; 20 MG/100ML; MG/100ML; MG/100ML; MG/100ML
INJECTION, SOLUTION INTRAVENOUS CONTINUOUS
Status: DISCONTINUED | OUTPATIENT
Start: 2021-10-15 | End: 2021-10-15 | Stop reason: HOSPADM

## 2021-10-15 RX ORDER — PROPOFOL 10 MG/ML
INJECTION, EMULSION INTRAVENOUS PRN
Status: DISCONTINUED | OUTPATIENT
Start: 2021-10-15 | End: 2021-10-15

## 2021-10-15 RX ORDER — OXYCODONE HYDROCHLORIDE 5 MG/1
5 TABLET ORAL EVERY 4 HOURS PRN
Status: DISCONTINUED | OUTPATIENT
Start: 2021-10-15 | End: 2021-10-15 | Stop reason: HOSPADM

## 2021-10-15 RX ORDER — ONDANSETRON 4 MG/1
4 TABLET, ORALLY DISINTEGRATING ORAL EVERY 30 MIN PRN
Status: DISCONTINUED | OUTPATIENT
Start: 2021-10-15 | End: 2021-10-15 | Stop reason: HOSPADM

## 2021-10-15 RX ORDER — NALOXONE HYDROCHLORIDE 0.4 MG/ML
0.2 INJECTION, SOLUTION INTRAMUSCULAR; INTRAVENOUS; SUBCUTANEOUS
Status: DISCONTINUED | OUTPATIENT
Start: 2021-10-15 | End: 2021-10-15 | Stop reason: HOSPADM

## 2021-10-15 RX ORDER — NALOXONE HYDROCHLORIDE 0.4 MG/ML
0.4 INJECTION, SOLUTION INTRAMUSCULAR; INTRAVENOUS; SUBCUTANEOUS
Status: DISCONTINUED | OUTPATIENT
Start: 2021-10-15 | End: 2021-10-15 | Stop reason: HOSPADM

## 2021-10-15 RX ORDER — MEPERIDINE HYDROCHLORIDE 25 MG/ML
12.5 INJECTION INTRAMUSCULAR; INTRAVENOUS; SUBCUTANEOUS
Status: CANCELLED | OUTPATIENT
Start: 2021-10-15

## 2021-10-15 RX ORDER — DIMENHYDRINATE 50 MG/ML
25 INJECTION, SOLUTION INTRAMUSCULAR; INTRAVENOUS
Status: CANCELLED | OUTPATIENT
Start: 2021-10-15

## 2021-10-15 RX ORDER — LABETALOL HYDROCHLORIDE 5 MG/ML
10 INJECTION, SOLUTION INTRAVENOUS
Status: CANCELLED | OUTPATIENT
Start: 2021-10-15

## 2021-10-15 RX ORDER — HYDROMORPHONE HYDROCHLORIDE 1 MG/ML
0.2 INJECTION, SOLUTION INTRAMUSCULAR; INTRAVENOUS; SUBCUTANEOUS EVERY 5 MIN PRN
Status: DISCONTINUED | OUTPATIENT
Start: 2021-10-15 | End: 2021-10-15 | Stop reason: HOSPADM

## 2021-10-15 RX ORDER — LIDOCAINE 40 MG/G
CREAM TOPICAL
Status: DISCONTINUED | OUTPATIENT
Start: 2021-10-15 | End: 2021-10-15 | Stop reason: HOSPADM

## 2021-10-15 RX ORDER — LIDOCAINE HYDROCHLORIDE 20 MG/ML
INJECTION, SOLUTION INFILTRATION; PERINEURAL PRN
Status: DISCONTINUED | OUTPATIENT
Start: 2021-10-15 | End: 2021-10-15

## 2021-10-15 RX ORDER — HYDRALAZINE HYDROCHLORIDE 20 MG/ML
2.5-5 INJECTION INTRAMUSCULAR; INTRAVENOUS EVERY 10 MIN PRN
Status: DISCONTINUED | OUTPATIENT
Start: 2021-10-15 | End: 2021-10-15 | Stop reason: HOSPADM

## 2021-10-15 RX ORDER — ONDANSETRON 2 MG/ML
4 INJECTION INTRAMUSCULAR; INTRAVENOUS EVERY 30 MIN PRN
Status: CANCELLED | OUTPATIENT
Start: 2021-10-15

## 2021-10-15 RX ORDER — FENTANYL CITRATE 50 UG/ML
25 INJECTION, SOLUTION INTRAMUSCULAR; INTRAVENOUS EVERY 5 MIN PRN
Status: DISCONTINUED | OUTPATIENT
Start: 2021-10-15 | End: 2021-10-15 | Stop reason: HOSPADM

## 2021-10-15 RX ORDER — CLOPIDOGREL BISULFATE 75 MG/1
75 TABLET ORAL DAILY
Status: ON HOLD | COMMUNITY
Start: 2021-10-15 | End: 2021-11-10

## 2021-10-15 RX ORDER — FLUMAZENIL 0.1 MG/ML
0.2 INJECTION, SOLUTION INTRAVENOUS
Status: DISCONTINUED | OUTPATIENT
Start: 2021-10-15 | End: 2021-10-15 | Stop reason: HOSPADM

## 2021-10-15 RX ORDER — MEPERIDINE HYDROCHLORIDE 25 MG/ML
12.5 INJECTION INTRAMUSCULAR; INTRAVENOUS; SUBCUTANEOUS
Status: DISCONTINUED | OUTPATIENT
Start: 2021-10-15 | End: 2021-10-15 | Stop reason: HOSPADM

## 2021-10-15 RX ORDER — SODIUM CHLORIDE, SODIUM LACTATE, POTASSIUM CHLORIDE, CALCIUM CHLORIDE 600; 310; 30; 20 MG/100ML; MG/100ML; MG/100ML; MG/100ML
INJECTION, SOLUTION INTRAVENOUS CONTINUOUS
Status: CANCELLED | OUTPATIENT
Start: 2021-10-15

## 2021-10-15 RX ORDER — PROPOFOL 10 MG/ML
INJECTION, EMULSION INTRAVENOUS CONTINUOUS PRN
Status: DISCONTINUED | OUTPATIENT
Start: 2021-10-15 | End: 2021-10-15

## 2021-10-15 RX ORDER — EPHEDRINE SULFATE 50 MG/ML
INJECTION, SOLUTION INTRAMUSCULAR; INTRAVENOUS; SUBCUTANEOUS PRN
Status: DISCONTINUED | OUTPATIENT
Start: 2021-10-15 | End: 2021-10-15

## 2021-10-15 RX ORDER — OXYCODONE HYDROCHLORIDE 5 MG/1
5 TABLET ORAL EVERY 4 HOURS PRN
Status: CANCELLED | OUTPATIENT
Start: 2021-10-15

## 2021-10-15 RX ORDER — ONDANSETRON 2 MG/ML
4 INJECTION INTRAMUSCULAR; INTRAVENOUS EVERY 30 MIN PRN
Status: DISCONTINUED | OUTPATIENT
Start: 2021-10-15 | End: 2021-10-15 | Stop reason: HOSPADM

## 2021-10-15 RX ORDER — HYDROMORPHONE HCL IN WATER/PF 6 MG/30 ML
0.2 PATIENT CONTROLLED ANALGESIA SYRINGE INTRAVENOUS EVERY 5 MIN PRN
Status: CANCELLED | OUTPATIENT
Start: 2021-10-15

## 2021-10-15 RX ADMIN — ROCURONIUM BROMIDE 30 MG: 10 INJECTION INTRAVENOUS at 09:05

## 2021-10-15 RX ADMIN — ONDANSETRON 4 MG: 2 INJECTION INTRAMUSCULAR; INTRAVENOUS at 09:17

## 2021-10-15 RX ADMIN — Medication 5 MG: at 08:52

## 2021-10-15 RX ADMIN — FENTANYL CITRATE 150 MCG: 50 INJECTION, SOLUTION INTRAMUSCULAR; INTRAVENOUS at 08:47

## 2021-10-15 RX ADMIN — PROPOFOL 20 MG: 10 INJECTION, EMULSION INTRAVENOUS at 09:02

## 2021-10-15 RX ADMIN — PHENYLEPHRINE HYDROCHLORIDE 50 MCG: 10 INJECTION INTRAVENOUS at 09:09

## 2021-10-15 RX ADMIN — Medication 100 MG: at 08:54

## 2021-10-15 RX ADMIN — PROPOFOL 20 MCG/KG/MIN: 10 INJECTION, EMULSION INTRAVENOUS at 08:55

## 2021-10-15 RX ADMIN — Medication 10 MG: at 08:57

## 2021-10-15 RX ADMIN — SUGAMMADEX 160 MG: 100 INJECTION, SOLUTION INTRAVENOUS at 09:32

## 2021-10-15 RX ADMIN — PROPOFOL 30 MG: 10 INJECTION, EMULSION INTRAVENOUS at 09:05

## 2021-10-15 RX ADMIN — PROPOFOL 150 MG: 10 INJECTION, EMULSION INTRAVENOUS at 08:52

## 2021-10-15 RX ADMIN — LIDOCAINE HYDROCHLORIDE 100 MG: 20 INJECTION, SOLUTION INFILTRATION; PERINEURAL at 08:51

## 2021-10-15 RX ADMIN — DEXAMETHASONE SODIUM PHOSPHATE 6 MG: 4 INJECTION, SOLUTION INTRA-ARTICULAR; INTRALESIONAL; INTRAMUSCULAR; INTRAVENOUS; SOFT TISSUE at 09:26

## 2021-10-15 RX ADMIN — SODIUM CHLORIDE, POTASSIUM CHLORIDE, SODIUM LACTATE AND CALCIUM CHLORIDE: 600; 310; 30; 20 INJECTION, SOLUTION INTRAVENOUS at 08:42

## 2021-10-15 RX ADMIN — PHENYLEPHRINE HYDROCHLORIDE 50 MCG: 10 INJECTION INTRAVENOUS at 09:01

## 2021-10-15 ASSESSMENT — COPD QUESTIONNAIRES: CAT_SEVERITY: MODERATE

## 2021-10-15 ASSESSMENT — MIFFLIN-ST. JEOR: SCORE: 1442.75

## 2021-10-15 NOTE — ANESTHESIA PREPROCEDURE EVALUATION
Anesthesia Pre-Procedure Evaluation    Patient: Dai Izquierdo   MRN: [id number] : 1945        Preoperative Diagnosis: Esophageal dysphagia [R13.19]    Procedure : Procedure(s):  ESOPHAGOGASTRODUODENOSCOPY, WITH STENT INSERTION at Gastric Stricture          Past Medical History:   Diagnosis Date     PONV (postoperative nausea and vomiting)       History reviewed. No pertinent surgical history.   No Known Allergies   Social History     Tobacco Use     Smoking status: Former Smoker     Smokeless tobacco: Never Used   Substance Use Topics     Alcohol use: Never      Wt Readings from Last 1 Encounters:   10/07/21 75.9 kg (167 lb 4.8 oz)        Anesthesia Evaluation            ROS/MED HX  ENT/Pulmonary: Comment:   COVID pneumonia 2020, recovered.     (+) sleep apnea, moderate, uses CPAP, moderate,  COPD,     Neurologic: Comment:   In : TIA vs positional hypotension/sincopal episode that resolved with no neuro deficits. He was started on Plavix for stroke prevention then (since ).    TIA: on plavix.   Cardiovascular: Comment:   Current meds:   Amlodipine - held DOS  Lipitor  Plavix    (+) Dyslipidemia hypertension-----    METS/Exercise Tolerance: >4 METS    Hematologic:  - neg hematologic  ROS     Musculoskeletal:   (+) arthritis,     GI/Hepatic: Comment:   *Bariatric Sx/vertical band gastroplasty in .   *Now with food regurgitation due to partial gastric outlet obstruction. Lost 50 lbs since 2020. Gastric Ring to be removed with EGD, and stent placed.  *GERD on oomeprazole  *Diverticular disease, with h/o perforation 2008        Renal/Genitourinary: Comment:   *Baseline creat 1.13, with estimated GFR ~64  *BPH, on terazosin - neg Renal ROS   (+) BPH,     Endo:  - neg endo ROS     Psychiatric/Substance Use:    : Daily Tylenol-codeine.   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 Splenic artery aneurysm;    - neg infectious  disease ROS     Malignancy:  - neg malignancy ROS     Other:  - neg other ROS    (+) , H/O Chronic Pain,        Physical Exam    Airway  airway exam normal      Mallampati: I   TM distance: > 3 FB   Neck ROM: limited   Mouth opening: > 3 cm    Respiratory Devices and Support         Dental  no notable dental history     (+) upper dentures      Cardiovascular   cardiovascular exam normal          Pulmonary   pulmonary exam normal                OUTSIDE LABS:  CBC:   Lab Results   Component Value Date    WBC 7.4 09/19/2021    WBC 8.1 09/18/2021    HGB 13.7 09/19/2021    HGB 14.3 09/18/2021    HCT 41.9 09/19/2021    HCT 43.4 09/18/2021     09/19/2021     09/18/2021     BMP:   Lab Results   Component Value Date     09/19/2021     09/18/2021    POTASSIUM 4.0 09/21/2021    POTASSIUM 3.9 09/20/2021    CHLORIDE 105 09/19/2021    CHLORIDE 107 09/18/2021    CO2 25 09/19/2021    CO2 28 09/18/2021    BUN 11 09/19/2021    BUN 16 09/18/2021    CR 1.03 09/19/2021    CR 1.13 09/18/2021    GLC 78 09/19/2021     (H) 09/18/2021     COAGS: No results found for: PTT, INR, FIBR  POC: No results found for: BGM, HCG, HCGS  HEPATIC:   Lab Results   Component Value Date    ALBUMIN 3.0 (L) 09/19/2021    PROTTOTAL 5.8 (L) 09/19/2021    ALT 15 09/19/2021    AST 15 09/19/2021    ALKPHOS 76 09/19/2021    BILITOTAL 0.7 09/19/2021     OTHER:   Lab Results   Component Value Date    LACT 0.7 09/18/2021    KATHERINE 8.6 09/19/2021    PHOS 3.5 09/19/2021    MAG 1.8 09/21/2021    CRP 0.7 09/18/2021       Anesthesia Plan    ASA Status:  3   NPO Status:  NPO Appropriate    Anesthesia Type: General.     - Airway: ETT   Induction: Intravenous.   Maintenance: Inhalation.        Consents    Anesthesia Plan(s) and associated risks, benefits, and realistic alternatives discussed. Questions answered and patient/representative(s) expressed understanding.     - Discussed with:  Patient      - Specific Concerns: aspiration risk  discussed.     - Extended Intubation/Ventilatory Support Discussed: Yes.      - Patient is DNR/DNI Status: No    Use of blood products discussed: No .     Postoperative Care    Pain management: IV analgesics, Oral pain medications.   PONV prophylaxis: Ondansetron (or other 5HT-3), Dexamethasone or Solumedrol, Background Propofol Infusion     Comments:    76 year old male here for EGD with stent placement by Dr Castro.   Anesthesia considerations and plan as stated above.     MD Hong Jacobs MD  Anesthesiology Resident, [place]-[facility]

## 2021-10-15 NOTE — ANESTHESIA PROCEDURE NOTES
Airway       Patient location during procedure: OR       Procedure Start/Stop Times: 10/15/2021 8:55 AM  Staff -        Anesthesiologist:  Pat Galvan MD       Resident/Fellow: Hong Workman MD       Performed By: resident  Consent for Airway        Urgency: elective  Indications and Patient Condition       Indications for airway management: pierre-procedural       Induction type:intravenous       Mask difficulty assessment: 1 - vent by mask    Final Airway Details       Final airway type: endotracheal airway       Successful airway: ETT - single  Endotracheal Airway Details        ETT size (mm): 7.5       Cuffed: yes       Successful intubation technique: direct laryngoscopy       DL Blade Type: MAC 3       Grade View of Cords: 2       Adjucts: stylet       Position: Right       Measured from: gums/teeth       Bite block used: None    Post intubation assessment        Placement verified by: capnometry, equal breath sounds and chest rise        Number of attempts at approach: 1       Number of other approaches attempted: 0       Secured with: pink tape       Ease of procedure: easy       Dentition: Intact    Additional Comments       Routine intubation.

## 2021-10-15 NOTE — OP NOTE
"[facility]    Operative Note    Pre-operative diagnosis: Esophageal dysphagia [R13.19]   Post-operative diagnosis Same  Gastric stricture   Procedure: Procedure(s):  ESOPHAGOGASTRODUODENOSCOPY, WITH STENT INSERTION at Gastric Stricture, Interrpretation of Fluoroscopy  Placement of a 7 cm length fully covered double flared (5-mm flares) 23-mm EndoMAXX stent across the area of interest (see findings).    Surgeon:      Assisting: Surgeon(s) and Role:     * Timothy Castro MD - Primary   Donna Rai MD - PGY4 Resident  Isaura Muniz   Anesthesia: General    Estimated blood loss: 2 ml   Drains: None   Specimens: * No specimens in log * - None   Findings: 1. Prior to stenting, the stricture measured ~9 mm and was located at the site of the gastric band. It was difficult to traverse with the endoscope, but a wire was able to be placed through and then endoscope was able to traverse over the wire.  2. A stent was placed across the stricture with the proximal flare located in the proximal stomach.  3. The stent was visualized after placement using fluoroscopy and endoscopy.    Complications: None.   Implants:  7 cm length fully covered double flared (5-mm flares) 23-mm EndoMAXX stent     INDICATIONS FOR PROCEDURE  Dai Izquierdo is a 76 year old male who underwent prior vertical banded gastroplasty surgery.    There is a diagnosis of gastrointestinal stricture at the gastric band causing dysphagia.    Height: 167.6 cm (5' 6\"), Weight: 76 kg (167 lb 8.8 oz), and currently the Body mass index is 27.04 kg/m ..      After understanding the risks and benefits of proceeding with an esophageal stent placement, he agreed to an operation as outlined by me.    I reviewed the risks of surgery with Dai Izquierdo and these include but are not limited to the following:    POTENTIAL COMPLICATIONS  Complications have been reported in the literature for " esophageal stent placement with both silicone stents and expandable metal stents.     PROCEDURAL COMPLICATIONS:    Bleeding    Esophageal perforation    Pain    Aspiration    POST-STENT PLACEMENT COMPLICATIONS:    Stent migration    Perforation    Bleeding    Pain/foreign body sensation    Occlusion due to lesion growth    Obstruction related to food volume    Infection    Reflux    Esophagitis    Esophageal ulceration    Edema    Fever    Fistula formation outside of normal disease progression    Death with cause outside of normal disease progression    DESCRIPTION OF PROCEDURE:   The patient was brought to the operating room, placed supine on the operating room table, and general anesthesia was induced.    A timeout was conducted with all members of the surgical team present to verify that the procedure and patient were correct.    The procedure was started by intubating the esophagus with a 9.9-mm adult gastroscope.     The scope was advanced to the gastric band and strictured area.     The stricture was measured at ~9mm and did not allow passage of the gastroscope; thus a 0.035mm Glidewire was passed through the stricture under endoscopic and fluoroscopic guidance. The gastroscope was able to be advanced over the wire with mild trauma and minimal bleeding and was confirmed by fluoroscopy to be in the distal stomach.  The pylorus was visualized and the wire placed through it. The pylorus was traversed and the first portion of the duodenum was found to be unremarkable. The gastroscope was removed with the wire left in place in the duodenum and confirmation of positioning of the wire was by fluoroscopy.    Next, the stent described above (7 cm length fully covered double flared (5-mm flares) 23-mm EndoMAXX stent) was then placed across the gastric stricture under fluoroscopic guidance.     The proximal flare was positioned proximal to the stricture under endoscopic guidance. The stent was deployed under radiologic  guidance.    The gastroscope was passed back into the esophagus and the stent was visualized.  The stent was noted to be slightly too distal and thus was retracted approximately 2 cm with a rat tooth instrument via the endoscope. Positioning was confirmed with fluoroscopy and direct visualization with endoscopy.    The gastroscope was removed.  During removal, the esophagus was visualized and no polyps were noted and the procedure was complete.  The patient tolerated the procedure well without any apparent complications and was extubated and transferred to the PACU in stable condition.    I was present for all critical components of the operation and all needle and sponge counts were correct x2 at the end of the procedure.    Timothy Castro MD  Surgery  [phone] (hospital )            Operative report initially prepared by:  Donna Rai MD  General Surgery PGY-4

## 2021-10-15 NOTE — ANESTHESIA CARE TRANSFER NOTE
Patient: Dai Izquierdo    Procedure: Procedure(s):  ESOPHAGOGASTRODUODENOSCOPY, WITH STENT INSERTION at Gastric Stricture, Interrpretation of Fluoroscopy       Diagnosis: Esophageal dysphagia [R13.19]  Diagnosis Additional Information: No value filed.    Anesthesia Type:   General     Note:    Oropharynx: oropharynx clear of all foreign objects and spontaneously breathing  Level of Consciousness: drowsy  Oxygen Supplementation: nasal cannula  Level of Supplemental Oxygen (L/min / FiO2): 4  Independent Airway: airway patency satisfactory and stable  Dentition: dentition unchanged  Vital Signs Stable: post-procedure vital signs reviewed and stable  Report to RN Given: handoff report given  Patient transferred to: PACU    Handoff Report: Identifed the Patient, Identified the Reponsible Provider, Reviewed the pertinent medical history, Discussed the surgical course, Reviewed Intra-OP anesthesia mangement and issues during anesthesia, Set expectations for post-procedure period and Allowed opportunity for questions and acknowledgement of understanding      Vitals:  Vitals Value Taken Time   /60 10/15/21 0943   Temp     Pulse 78 10/15/21 0945   Resp 21 10/15/21 0945   SpO2 94 % 10/15/21 0945   Vitals shown include unvalidated device data.    Electronically Signed By: Hong Workman MD  October 15, 2021  9:46 AM

## 2021-10-15 NOTE — DISCHARGE INSTRUCTIONS
General acute hospital  Same-Day Surgery   Adult Discharge Orders & Instructions     For 24 hours after surgery    1. Get plenty of rest.  A responsible adult must stay with you for at least 24 hours after you leave the hospital.   2. Do not drive or use heavy equipment.  If you have weakness or tingling, don't drive or use heavy equipment until this feeling goes away.  3. Do not drink alcohol.  4. Avoid strenuous or risky activities.  Ask for help when climbing stairs.   5. You may feel lightheaded.  IF so, sit for a few minutes before standing.  Have someone help you get up.   6. If you have nausea (feel sick to your stomach): Drink only clear liquids such as apple juice, ginger ale, broth or 7-Up.  Rest may also help.  Be sure to drink enough fluids.  Move to a regular diet as you feel able.  7. You may have a slight fever. Call the doctor if your fever is over 100 F (37.7 C) (taken under the tongue) or lasts longer than 24 hours.  8. You may have a dry mouth, a sore throat, muscle aches or trouble sleeping.  These should go away after 24 hours.  9. Do not make important or legal decisions.   Call your doctor for any of the followin.  Signs of infection (fever, growing tenderness at the surgery site, a large amount of drainage or bleeding, severe pain, foul-smelling drainage, redness, swelling).    2. It has been over 8 to 10 hours since surgery and you are still not able to urinate (pass water).    3.  Headache for over 24 hours.    To contact a doctor, call Dr Castro's clinic at [phone] (Monday-Friday 8:00 a.m.-4:30 p.m.)    or [phone] and ask for the resident on call for General Surgery (answered 24 hours a day)      Emergency Department:    [facility]: [phone]       (TTY for hearing impaired: [phone])    [facility]: [phone]       (TTY for hearing impaired: [phone])

## 2021-10-15 NOTE — ANESTHESIA POSTPROCEDURE EVALUATION
Patient: Dai Izquierdo    Procedure: Procedure(s):  ESOPHAGOGASTRODUODENOSCOPY, WITH STENT INSERTION at Gastric Stricture, Interrpretation of Fluoroscopy       Diagnosis:Esophageal dysphagia [R13.19]  Diagnosis Additional Information: No value filed.    Anesthesia Type:  General    Note:  Disposition: Outpatient   Postop Pain Control: Uneventful            Sign Out: Well controlled pain   PONV: No   Neuro/Psych: Uneventful            Sign Out: Acceptable/Baseline neuro status   Airway/Respiratory: Uneventful            Sign Out: Acceptable/Baseline resp. status   CV/Hemodynamics: Uneventful            Sign Out: Acceptable CV status; No obvious hypovolemia; No obvious fluid overload   Other NRE: NONE   DID A NON-ROUTINE EVENT OCCUR? No           Last vitals:  Vitals Value Taken Time   /72 10/15/21 1030   Temp 36.5  C (97.7  F) 10/15/21 1030   Pulse 58 10/15/21 1038   Resp 18 10/15/21 1038   SpO2 100 % 10/15/21 1038   Vitals shown include unvalidated device data.    Electronically Signed By: Pat Morocho MD  October 15, 2021  10:39 AM

## 2021-10-18 ENCOUNTER — PATIENT OUTREACH (OUTPATIENT)
Dept: ENDOCRINOLOGY | Facility: CLINIC | Age: 76
End: 2021-10-18

## 2021-10-18 NOTE — PROGRESS NOTES
RN Post-Op/Post-Discharge Care Coordination Note    Mr. Dai Izquierdo is a 76 year old male who underwent EGD with Stent Placement on 10/15/21 with  Dr. Lucius Castro.    Left message for patient to return call.

## 2021-10-27 DIAGNOSIS — Z11.59 ENCOUNTER FOR SCREENING FOR OTHER VIRAL DISEASES: ICD-10-CM

## 2021-10-28 ENCOUNTER — TELEPHONE (OUTPATIENT)
Dept: ENDOCRINOLOGY | Facility: CLINIC | Age: 76
End: 2021-10-28

## 2021-10-28 NOTE — TELEPHONE ENCOUNTER
Called patient to find out where he was going to get his COVID-19 testing done. He has endoscopy w/stent and VBG ring removal scheduled on 11/10/21.   He has been scheduled on 11/7 at 11:50 a.m. at [facility] for the lab draw.  Order faxed to [phone].

## 2021-11-09 ENCOUNTER — ANESTHESIA EVENT (OUTPATIENT)
Dept: SURGERY | Facility: CLINIC | Age: 76
End: 2021-11-09
Payer: COMMERCIAL

## 2021-11-10 ENCOUNTER — ANESTHESIA (OUTPATIENT)
Dept: SURGERY | Facility: CLINIC | Age: 76
End: 2021-11-10
Payer: COMMERCIAL

## 2021-11-10 ENCOUNTER — HOSPITAL ENCOUNTER (OUTPATIENT)
Facility: CLINIC | Age: 76
Discharge: HOME | End: 2021-11-10
Attending: SURGERY | Admitting: SURGERY
Payer: COMMERCIAL

## 2021-11-10 VITALS
SYSTOLIC BLOOD PRESSURE: 129 MMHG | OXYGEN SATURATION: 97 % | DIASTOLIC BLOOD PRESSURE: 71 MMHG | RESPIRATION RATE: 16 BRPM | TEMPERATURE: 97.8 F | WEIGHT: 179.9 LBS | BODY MASS INDEX: 28.91 KG/M2 | HEART RATE: 67 BPM | HEIGHT: 66 IN

## 2021-11-10 DIAGNOSIS — R13.19 ESOPHAGEAL DYSPHAGIA: ICD-10-CM

## 2021-11-10 LAB — GLUCOSE BLDC GLUCOMTR-MCNC: 92 MG/DL (ref 70–99)

## 2021-11-10 PROCEDURE — 82962 GLUCOSE BLOOD TEST: CPT

## 2021-11-10 PROCEDURE — 710N000010 HC RECOVERY PHASE 1, LEVEL 2, PER MIN: Performed by: SURGERY

## 2021-11-10 PROCEDURE — 250N000009 HC RX 250

## 2021-11-10 PROCEDURE — 250N000025 HC SEVOFLURANE, PER MIN: Performed by: SURGERY

## 2021-11-10 PROCEDURE — 999N000141 HC STATISTIC PRE-PROCEDURE NURSING ASSESSMENT: Performed by: SURGERY

## 2021-11-10 PROCEDURE — 370N000017 HC ANESTHESIA TECHNICAL FEE, PER MIN: Performed by: SURGERY

## 2021-11-10 PROCEDURE — 272N000001 HC OR GENERAL SUPPLY STERILE: Performed by: SURGERY

## 2021-11-10 PROCEDURE — 710N000012 HC RECOVERY PHASE 2, PER MINUTE: Performed by: SURGERY

## 2021-11-10 PROCEDURE — 43247 EGD REMOVE FOREIGN BODY: CPT | Performed by: SURGERY

## 2021-11-10 PROCEDURE — 258N000003 HC RX IP 258 OP 636

## 2021-11-10 PROCEDURE — 360N000082 HC SURGERY LEVEL 2 W/ FLUORO, PER MIN: Performed by: SURGERY

## 2021-11-10 PROCEDURE — 250N000011 HC RX IP 250 OP 636

## 2021-11-10 RX ORDER — NALOXONE HYDROCHLORIDE 0.4 MG/ML
0.4 INJECTION, SOLUTION INTRAMUSCULAR; INTRAVENOUS; SUBCUTANEOUS
Status: DISCONTINUED | OUTPATIENT
Start: 2021-11-10 | End: 2021-11-10 | Stop reason: HOSPADM

## 2021-11-10 RX ORDER — DEXAMETHASONE SODIUM PHOSPHATE 4 MG/ML
INJECTION, SOLUTION INTRA-ARTICULAR; INTRALESIONAL; INTRAMUSCULAR; INTRAVENOUS; SOFT TISSUE PRN
Status: DISCONTINUED | OUTPATIENT
Start: 2021-11-10 | End: 2021-11-10

## 2021-11-10 RX ORDER — FENTANYL CITRATE 50 UG/ML
25 INJECTION, SOLUTION INTRAMUSCULAR; INTRAVENOUS
Status: DISCONTINUED | OUTPATIENT
Start: 2021-11-10 | End: 2021-11-10 | Stop reason: HOSPADM

## 2021-11-10 RX ORDER — DIMENHYDRINATE 50 MG/ML
25 INJECTION, SOLUTION INTRAMUSCULAR; INTRAVENOUS
Status: DISCONTINUED | OUTPATIENT
Start: 2021-11-10 | End: 2021-11-10 | Stop reason: HOSPADM

## 2021-11-10 RX ORDER — MEPERIDINE HYDROCHLORIDE 25 MG/ML
12.5 INJECTION INTRAMUSCULAR; INTRAVENOUS; SUBCUTANEOUS
Status: DISCONTINUED | OUTPATIENT
Start: 2021-11-10 | End: 2021-11-10 | Stop reason: HOSPADM

## 2021-11-10 RX ORDER — ONDANSETRON 4 MG/1
4 TABLET, ORALLY DISINTEGRATING ORAL EVERY 30 MIN PRN
Status: DISCONTINUED | OUTPATIENT
Start: 2021-11-10 | End: 2021-11-10 | Stop reason: HOSPADM

## 2021-11-10 RX ORDER — ONDANSETRON 2 MG/ML
4 INJECTION INTRAMUSCULAR; INTRAVENOUS EVERY 30 MIN PRN
Status: DISCONTINUED | OUTPATIENT
Start: 2021-11-10 | End: 2021-11-10 | Stop reason: HOSPADM

## 2021-11-10 RX ORDER — PROPOFOL 10 MG/ML
INJECTION, EMULSION INTRAVENOUS CONTINUOUS PRN
Status: DISCONTINUED | OUTPATIENT
Start: 2021-11-10 | End: 2021-11-10

## 2021-11-10 RX ORDER — CLOPIDOGREL BISULFATE 75 MG/1
75 TABLET ORAL DAILY
COMMUNITY
Start: 2021-11-12

## 2021-11-10 RX ORDER — NALOXONE HYDROCHLORIDE 0.4 MG/ML
0.2 INJECTION, SOLUTION INTRAMUSCULAR; INTRAVENOUS; SUBCUTANEOUS
Status: DISCONTINUED | OUTPATIENT
Start: 2021-11-10 | End: 2021-11-10 | Stop reason: HOSPADM

## 2021-11-10 RX ORDER — SODIUM CHLORIDE, SODIUM LACTATE, POTASSIUM CHLORIDE, CALCIUM CHLORIDE 600; 310; 30; 20 MG/100ML; MG/100ML; MG/100ML; MG/100ML
INJECTION, SOLUTION INTRAVENOUS CONTINUOUS
Status: DISCONTINUED | OUTPATIENT
Start: 2021-11-10 | End: 2021-11-10 | Stop reason: HOSPADM

## 2021-11-10 RX ORDER — FENTANYL CITRATE 50 UG/ML
25 INJECTION, SOLUTION INTRAMUSCULAR; INTRAVENOUS EVERY 5 MIN PRN
Status: DISCONTINUED | OUTPATIENT
Start: 2021-11-10 | End: 2021-11-10 | Stop reason: HOSPADM

## 2021-11-10 RX ORDER — OXYCODONE HYDROCHLORIDE 5 MG/1
5 TABLET ORAL EVERY 4 HOURS PRN
Status: DISCONTINUED | OUTPATIENT
Start: 2021-11-10 | End: 2021-11-10 | Stop reason: HOSPADM

## 2021-11-10 RX ORDER — LABETALOL HYDROCHLORIDE 5 MG/ML
10 INJECTION, SOLUTION INTRAVENOUS
Status: DISCONTINUED | OUTPATIENT
Start: 2021-11-10 | End: 2021-11-10 | Stop reason: HOSPADM

## 2021-11-10 RX ORDER — SODIUM CHLORIDE, SODIUM LACTATE, POTASSIUM CHLORIDE, CALCIUM CHLORIDE 600; 310; 30; 20 MG/100ML; MG/100ML; MG/100ML; MG/100ML
INJECTION, SOLUTION INTRAVENOUS CONTINUOUS PRN
Status: DISCONTINUED | OUTPATIENT
Start: 2021-11-10 | End: 2021-11-10

## 2021-11-10 RX ORDER — HYDRALAZINE HYDROCHLORIDE 20 MG/ML
2.5-5 INJECTION INTRAMUSCULAR; INTRAVENOUS EVERY 10 MIN PRN
Status: DISCONTINUED | OUTPATIENT
Start: 2021-11-10 | End: 2021-11-10 | Stop reason: HOSPADM

## 2021-11-10 RX ORDER — PROPOFOL 10 MG/ML
INJECTION, EMULSION INTRAVENOUS PRN
Status: DISCONTINUED | OUTPATIENT
Start: 2021-11-10 | End: 2021-11-10

## 2021-11-10 RX ORDER — HYDROMORPHONE HCL IN WATER/PF 6 MG/30 ML
0.2 PATIENT CONTROLLED ANALGESIA SYRINGE INTRAVENOUS EVERY 5 MIN PRN
Status: DISCONTINUED | OUTPATIENT
Start: 2021-11-10 | End: 2021-11-10 | Stop reason: HOSPADM

## 2021-11-10 RX ORDER — FENTANYL CITRATE 50 UG/ML
INJECTION, SOLUTION INTRAMUSCULAR; INTRAVENOUS PRN
Status: DISCONTINUED | OUTPATIENT
Start: 2021-11-10 | End: 2021-11-10

## 2021-11-10 RX ORDER — ONDANSETRON 2 MG/ML
INJECTION INTRAMUSCULAR; INTRAVENOUS PRN
Status: DISCONTINUED | OUTPATIENT
Start: 2021-11-10 | End: 2021-11-10

## 2021-11-10 RX ORDER — FLUMAZENIL 0.1 MG/ML
0.2 INJECTION, SOLUTION INTRAVENOUS
Status: DISCONTINUED | OUTPATIENT
Start: 2021-11-10 | End: 2021-11-10 | Stop reason: HOSPADM

## 2021-11-10 RX ORDER — LIDOCAINE 40 MG/G
CREAM TOPICAL
Status: DISCONTINUED | OUTPATIENT
Start: 2021-11-10 | End: 2021-11-10 | Stop reason: HOSPADM

## 2021-11-10 RX ORDER — HYDROMORPHONE HYDROCHLORIDE 1 MG/ML
0.2 INJECTION, SOLUTION INTRAMUSCULAR; INTRAVENOUS; SUBCUTANEOUS EVERY 5 MIN PRN
Status: DISCONTINUED | OUTPATIENT
Start: 2021-11-10 | End: 2021-11-10 | Stop reason: HOSPADM

## 2021-11-10 RX ORDER — LIDOCAINE HYDROCHLORIDE 20 MG/ML
INJECTION, SOLUTION INFILTRATION; PERINEURAL PRN
Status: DISCONTINUED | OUTPATIENT
Start: 2021-11-10 | End: 2021-11-10

## 2021-11-10 RX ADMIN — FENTANYL CITRATE 50 MCG: 50 INJECTION, SOLUTION INTRAMUSCULAR; INTRAVENOUS at 07:59

## 2021-11-10 RX ADMIN — DEXAMETHASONE SODIUM PHOSPHATE 6 MG: 4 INJECTION, SOLUTION INTRA-ARTICULAR; INTRALESIONAL; INTRAMUSCULAR; INTRAVENOUS; SOFT TISSUE at 08:00

## 2021-11-10 RX ADMIN — Medication 50 MG: at 07:45

## 2021-11-10 RX ADMIN — PROPOFOL 200 MCG/KG/MIN: 10 INJECTION, EMULSION INTRAVENOUS at 07:48

## 2021-11-10 RX ADMIN — PROPOFOL 90 MG: 10 INJECTION, EMULSION INTRAVENOUS at 07:45

## 2021-11-10 RX ADMIN — ONDANSETRON 4 MG: 2 INJECTION INTRAMUSCULAR; INTRAVENOUS at 08:10

## 2021-11-10 RX ADMIN — PROPOFOL 30 MG: 10 INJECTION, EMULSION INTRAVENOUS at 07:58

## 2021-11-10 RX ADMIN — SODIUM CHLORIDE, POTASSIUM CHLORIDE, SODIUM LACTATE AND CALCIUM CHLORIDE: 600; 310; 30; 20 INJECTION, SOLUTION INTRAVENOUS at 07:42

## 2021-11-10 RX ADMIN — FENTANYL CITRATE 25 MCG: 50 INJECTION, SOLUTION INTRAMUSCULAR; INTRAVENOUS at 07:45

## 2021-11-10 RX ADMIN — LIDOCAINE HYDROCHLORIDE 100 MG: 20 INJECTION, SOLUTION INFILTRATION; PERINEURAL at 07:45

## 2021-11-10 RX ADMIN — PROPOFOL 30 MG: 10 INJECTION, EMULSION INTRAVENOUS at 08:02

## 2021-11-10 RX ADMIN — PROPOFOL 20 MG: 10 INJECTION, EMULSION INTRAVENOUS at 08:08

## 2021-11-10 RX ADMIN — PROPOFOL 30 MG: 10 INJECTION, EMULSION INTRAVENOUS at 08:05

## 2021-11-10 ASSESSMENT — MIFFLIN-ST. JEOR: SCORE: 1498.75

## 2021-11-10 ASSESSMENT — COPD QUESTIONNAIRES
COPD: 1
CAT_SEVERITY: MODERATE

## 2021-11-10 NOTE — ANESTHESIA PREPROCEDURE EVALUATION
Anesthesia Pre-Procedure Evaluation    Patient: Dai Izquierdo   MRN: [id number] : 1945        Preoperative Diagnosis: Esophageal dysphagia [R13.19]    Procedure : Procedure(s):  ESOPHAGOGASTRODUODENOSCOPY, WITH ESOPHAGEAL STENT REMOVAL          Past Medical History:   Diagnosis Date     Hypertension      PONV (postoperative nausea and vomiting)      Sleep apnea       Past Surgical History:   Procedure Laterality Date     APPENDECTOMY       COLONOSCOPY       GI SURGERY       ORTHOPEDIC SURGERY      Total hips, rotator cuff repairs      No Known Allergies   Social History     Tobacco Use     Smoking status: Former Smoker     Smokeless tobacco: Never Used   Substance Use Topics     Alcohol use: Never      Wt Readings from Last 1 Encounters:   11/10/21 81.6 kg (179 lb 14.3 oz)        Anesthesia Evaluation   Pt has had prior anesthetic. Type: General and MAC.    History of anesthetic complications  - PONV.      ROS/MED HX  ENT/Pulmonary: Comment:   COVID pneumonia 2020, recovered.     (+) sleep apnea, moderate, uses CPAP, moderate,  COPD,     Neurologic: Comment:   In : TIA vs positional hypotension/sincopal episode that resolved with no neuro deficits. He was started on Plavix for stroke prevention then (since ).    TIA: on plavix.   Cardiovascular: Comment:   Current meds:   Amlodipine - held DOS  Lipitor  Plavix    (+) Dyslipidemia hypertension-----    METS/Exercise Tolerance: >4 METS    Hematologic:  - neg hematologic  ROS     Musculoskeletal:   (+) arthritis,     GI/Hepatic: Comment:   *Bariatric Sx/vertical band gastroplasty in .   *Now with food regurgitation due to partial gastric outlet obstruction. Lost 50 lbs since 2020. Gastric Ring to be removed with EGD, and stent placed.  *GERD on oomeprazole  *Diverticular disease, with h/o perforation 2008        Renal/Genitourinary: Comment:   *Baseline creat 1.13, with estimated GFR ~64  *BPH, on terazosin - neg Renal ROS   (+) BPH,      Endo:  - neg endo ROS     Psychiatric/Substance Use:    : Daily Tylenol-codeine.   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;    - neg infectious disease ROS     Malignancy:  - neg malignancy ROS     Other:  - neg other ROS    (+) , H/O Chronic Pain,        Physical Exam    Airway  airway exam normal      Mallampati: I   TM distance: > 3 FB   Neck ROM: limited   Mouth opening: > 3 cm    Respiratory Devices and Support         Dental  no notable dental history     (+) upper dentures      Cardiovascular   cardiovascular exam normal          Pulmonary   pulmonary exam normal                OUTSIDE LABS:  CBC:   Lab Results   Component Value Date    WBC 7.4 09/19/2021    WBC 8.1 09/18/2021    HGB 13.7 09/19/2021    HGB 14.3 09/18/2021    HCT 41.9 09/19/2021    HCT 43.4 09/18/2021     09/19/2021     09/18/2021     BMP:   Lab Results   Component Value Date     09/19/2021     09/18/2021    POTASSIUM 4.0 09/21/2021    POTASSIUM 3.9 09/20/2021    CHLORIDE 105 09/19/2021    CHLORIDE 107 09/18/2021    CO2 25 09/19/2021    CO2 28 09/18/2021    BUN 11 09/19/2021    BUN 16 09/18/2021    CR 1.03 09/19/2021    CR 1.13 09/18/2021    GLC 92 11/10/2021    GLC 77 10/15/2021     COAGS: No results found for: PTT, INR, FIBR  POC: No results found for: BGM, HCG, HCGS  HEPATIC:   Lab Results   Component Value Date    ALBUMIN 3.0 (L) 09/19/2021    PROTTOTAL 5.8 (L) 09/19/2021    ALT 15 09/19/2021    AST 15 09/19/2021    ALKPHOS 76 09/19/2021    BILITOTAL 0.7 09/19/2021     OTHER:   Lab Results   Component Value Date    LACT 0.7 09/18/2021    KATHERINE 8.6 09/19/2021    PHOS 3.5 09/19/2021    MAG 1.8 09/21/2021    CRP 0.7 09/18/2021       Anesthesia Plan    ASA Status:  3      Anesthesia Type: General.     - Airway: ETT   Induction: Intravenous, Propofol.   Maintenance: Balanced.   Techniques and Equipment:     - Airway:  Video-Laryngoscope     - Lines/Monitors: 2nd IV     Consents    Anesthesia Plan(s) and associated risks, benefits, and realistic alternatives discussed. Questions answered and patient/representative(s) expressed understanding.     - Discussed with:  Patient      - Extended Intubation/Ventilatory Support Discussed: No.      - Patient is DNR/DNI Status: No    Use of blood products discussed: No .     Postoperative Care    Pain management: IV analgesics.   PONV prophylaxis: Ondansetron (or other 5HT-3), Dexamethasone or Solumedrol     Comments:                Brandee Erickson MD

## 2021-11-10 NOTE — OP NOTE
"[facility]    Operative Note    Pre-operative diagnosis: Esophageal dysphagia [R13.19]   Post-operative diagnosis Same   Procedure: Procedure(s):  ESOPHAGOGASTRODUODENOSCOPY, WITH ESOPHAGEAL STENT REMOVAL with Silastic ring removal   Surgeons: Surgeon(s) and Role:     * Timothy Castro MD - Primary   Donna Rai MD - PGY4 Resident   Anesthesia: General    Estimated blood loss: None   Drains: None   Specimens: * No specimens in log *   Findings: 1. An esophageal stent was found at the location of the Silastic ring and gastroesophageal stricture  2. The stent was removed by grasping the proximal end ring and pulling the stent retrograde.  3. I visualized the area of the stent and the esophagus after stent removal and there was no evidence of esophageal injury.   Complications: None.   Implants: None.       COMORBIDITIES:   Past Medical History:   Diagnosis Date     Hypertension      PONV (postoperative nausea and vomiting)      Sleep apnea        INDICATIONS FOR PROCEDURE  Dai Izquierdo is a 76 year old male who underwent prior vertical banded gastroplasty surgery.    An esophageal stent was previously placed for Silastic ring removal to treat gastrointestinal stricture and dysphagia.    Height: 167.6 cm (5' 6\"), Weight: 81.6 kg (179 lb 14.3 oz), and currently the Body mass index is 29.04 kg/m ..      After understanding the risks and benefits of proceeding with an esophageal stent placement, he agreed to an operation as outlined by me.    I reviewed the risks of surgery with Dai Izquierdo and these include but are not limited to the following:    POTENTIAL COMPLICATIONS  Complications have been reported in the literature for esophageal stent removal.     PROCEDURAL COMPLICATIONS:    Bleeding    Esophageal or stomach perforation    Pain    Aspiration    Failure of stent to fix the problem intended    Reflux    Esophagitis    Esophageal ulceration    Edema    " Fever    Fistula formation outside of normal disease progression    Death with cause outside of normal disease progression    DESCRIPTION OF PROCEDURE:   The patient was brought to the operating room, placed supine on the operating room table, and general anesthesia was induced.    A timeout was conducted with all members of the surgical team present to verify that the procedure and patient were correct.    The procedure was started by intubating the esophagus with a 9-mm adult endoscope.     The scope was advanced into the stomach. The esophageal stent was located and it was traversed.    I elected to grasp the proximal end metal ring of the stent and in this manner, I disengaged the stent from the adjacent mucosa and then I pulled the stent in a controlled movement retrograde and ultimately out of the mouth.    The stent was intact. The Silastic ring from the patient's prior vertical banded gastroplasty was noted to be adhered to the esophageal stent and was removed in its entirety as well.         The gastroscope was passed back into the esophagus to evaluate for injury.    I evaluated the location of the previously placed stent and it had mild adherent clot that was irrigated. No active bleeding was noted and no mucosal disruption was seen. The stomach and esophagus held insufflation well and no pneumoperitoneum was found upon palpation.    The gastroscope was removed and the procedure was complete.    I was present for all critical components of the operation and all needle and sponge counts were correct x2 at the end of the procedure.    Timothy Castro MD  Surgery  [phone] (hospital )        Operative report initially prepared by:  Donna Rai MD  General Surgery PGY-4

## 2021-11-10 NOTE — ANESTHESIA PROCEDURE NOTES
Airway       Patient location during procedure: OR       Procedure Start/Stop Times: 11/10/2021 7:48 AM  Staff -        Anesthesiologist:  Brandee Erickson MD       CRNA: Kavon Marion APRN CRNA       Performed By: CRNAIndications and Patient Condition       Indications for airway management: pierre-procedural       Induction type:RSI       Mask difficulty assessment: 0 - not attempted    Final Airway Details       Final airway type: endotracheal airway       Successful airway: ETT - single  Endotracheal Airway Details        ETT size (mm): 7.5       Successful intubation technique: video laryngoscopy       VL Blade Size: MAC 3       Grade View of Cords: 1       Adjucts: stylet       Position: Right       Measured from: gums/teeth       Secured at (cm): 23       Bite block used: None    Post intubation assessment        Placement verified by: capnometry, equal breath sounds and chest rise        Number of attempts at approach: 1       Secured with: pink tape       Ease of procedure: easy       Dentition: Intact and Unchanged

## 2021-11-10 NOTE — DISCHARGE INSTRUCTIONS
[facility]  Same-Day Surgery   Adult Discharge Orders & Instructions     For 24 hours after surgery    1. Get plenty of rest.  A responsible adult must stay with you for at least 24 hours after you leave the hospital.   2. Do not drive or use heavy equipment.  If you have weakness or tingling, don't drive or use heavy equipment until this feeling goes away.  3. Do not drink alcohol.  4. Avoid strenuous or risky activities.  Ask for help when climbing stairs.   5. You may feel lightheaded.  IF so, sit for a few minutes before standing.  Have someone help you get up.   6. If you have nausea (feel sick to your stomach): Drink only clear liquids such as apple juice, ginger ale, broth or 7-Up.  Rest may also help.  Be sure to drink enough fluids.  Move to a regular diet as you feel able.  7. You may have a slight fever. Call the doctor if your fever is over 100 F (37.7 C) (taken under the tongue) or lasts longer than 24 hours.  8. You may have a dry mouth, a sore throat, muscle aches or trouble sleeping.  These should go away after 24 hours.  9. Do not make important or legal decisions.   Call your doctor for any of the followin.  Signs of infection (fever, growing tenderness at the surgery site, a large amount of drainage or bleeding, severe pain, foul-smelling drainage, redness, swelling).    2. It has been over 8 to 10 hours since surgery and you are still not able to urinate (pass water).    3.  Headache for over 24 hours.    4.  Numbness, tingling or weakness the day after surgery (if you had spinal anesthesia).  To contact a doctor, call [phone]  or:        [phone] and ask for the resident on call for GI (answered 24 hours a day)      Emergency Department:    [facility]: [phone]       (TTY for hearing impaired: [phone])

## 2021-11-10 NOTE — ANESTHESIA POSTPROCEDURE EVALUATION
Patient: Dai Izquierdo    Procedure: Procedure(s):  ESOPHAGOGASTRODUODENOSCOPY, WITH ESOPHAGEAL STENT REMOVAL       Diagnosis:Esophageal dysphagia [R13.19]  Diagnosis Additional Information: No value filed.    Anesthesia Type:  General    Note:  Disposition: Outpatient   Postop Pain Control: Uneventful            Sign Out: Well controlled pain   PONV: No   Neuro/Psych: Uneventful            Sign Out: Acceptable/Baseline neuro status   Airway/Respiratory: Uneventful            Sign Out: Acceptable/Baseline resp. status   CV/Hemodynamics: Uneventful            Sign Out: Acceptable CV status; No obvious hypovolemia; No obvious fluid overload   Other NRE: NONE   DID A NON-ROUTINE EVENT OCCUR? No           Last vitals:  Vitals Value Taken Time   /64 11/10/21 0830   Temp 35.9  C (96.62  F) 11/10/21 0838   Pulse 72 11/10/21 0838   Resp     SpO2 95 % 11/10/21 0838   Vitals shown include unvalidated device data.    Electronically Signed By: Brandee Erickson MD  November 10, 2021  8:40 AM

## 2021-11-10 NOTE — ANESTHESIA CARE TRANSFER NOTE
Patient: Dai Izquierdo    Procedure: Procedure(s):  ESOPHAGOGASTRODUODENOSCOPY, WITH ESOPHAGEAL STENT REMOVAL       Diagnosis: Esophageal dysphagia [R13.19]  Diagnosis Additional Information: No value filed.    Anesthesia Type:   General     Note:    Oropharynx: oropharynx clear of all foreign objects  Level of Consciousness: drowsy  Oxygen Supplementation: face mask  Level of Supplemental Oxygen (L/min / FiO2): 6  Independent Airway: airway patency satisfactory and stable  Dentition: dentition unchanged  Vital Signs Stable: post-procedure vital signs reviewed and stable  Report to RN Given: handoff report given  Patient transferred to: PACU    Handoff Report: Identifed the Patient, Identified the Reponsible Provider, Reviewed the pertinent medical history, Discussed the surgical course, Reviewed Intra-OP anesthesia mangement and issues during anesthesia, Set expectations for post-procedure period and Allowed opportunity for questions and acknowledgement of understanding      Vitals:  Vitals Value Taken Time   /64 11/10/21 0830   Temp     Pulse 66 11/10/21 0833   Resp     SpO2 99 % 11/10/21 0833   Vitals shown include unvalidated device data.    Electronically Signed By: OPAL Hayes CRNA  November 10, 2021  8:34 AM

## 2021-11-10 NOTE — BRIEF OP NOTE
[facility]    Brief Operative Note    Pre-operative diagnosis: Esophageal dysphagia [R13.19]  Post-operative diagnosis Same    Procedure: Procedure(s):  ESOPHAGOGASTRODUODENOSCOPY, WITH ESOPHAGEAL STENT REMOVAL  Surgeon: Surgeon(s) and Role:     * Timothy Castro MD - Primary   Assistant: Donna Rai MD PGY4  Anesthesia: General   Estimated Blood Loss:None    Drains: None  Specimens: * No specimens in log *  Findings:   Esophageal stent removed with silastic ring intact.  Complications: None.  Implants:   Implant Name Type Inv. Item Serial No.  Lot No. LRB No. Used Action   STENT ESOPHAGEAL ENDOMAXX [id number] FLOR-237 - [id number] Stent STENT ESOPHAGEAL ENDOMAXX [id number] FLOR-237  [facility] Stuffle [id number] N/A 1 Explanted

## 2021-11-12 ENCOUNTER — PATIENT OUTREACH (OUTPATIENT)
Dept: ENDOCRINOLOGY | Facility: CLINIC | Age: 76
End: 2021-11-12
Payer: COMMERCIAL

## 2021-11-12 NOTE — PROGRESS NOTES
RN Post-Op/Post-Discharge Care Coordination Note    Mr. Dai Izquierdo is a 76 year old male who underwent EGD with Stent Removal on 11/10/21 with  Dr. Lucius Castro.  Spoke with Patient.    Support  Patient able to care for self independently     Health Status  Nausea/Vomiting: Patient denies nausea/vomiting.    Eating/drinking: Patient is able to eat and drink without any complaints.  States he needs to remember to chew his food well.  Bowel habits: Patient reports having a normal bowel movement.  Drains (RICO): N/A  Fevers/chills: Patient denies any fever or chills.  Incisions: Patient denies any signs and symptoms of infection..  Wound closure:  N/A  Pain: 0  New Medications:  none    Activity/Restrictions  No lifting in excess of 15-20 pounds for 3-4 weeks    Equipment  None    Pathology reviewed with patient:  N/A    Forms/Letters  No    All of his questions were answered including reviewing restrictions, and wound care.  He will call this office if he has any further questions and/or concerns.

## 2021-11-23 ENCOUNTER — TELEPHONE (OUTPATIENT)
Dept: ENDOCRINOLOGY | Facility: CLINIC | Age: 76
End: 2021-11-23
Payer: COMMERCIAL

## 2021-11-23 NOTE — TELEPHONE ENCOUNTER
"Patient left  message stating he had endoscopy with stent and VBG ring removal with Dr. Castro on 11/10/21. He states he is \"having trouble getting food down, even [employer]\", requesting what he should do or who he should see as he is currently wintering in [place].   Will forward request to clinic RN to address with Dr. Castro.   "

## 2021-11-24 ENCOUNTER — TELEPHONE (OUTPATIENT)
Dept: ENDOCRINOLOGY | Facility: CLINIC | Age: 76
End: 2021-11-24
Payer: COMMERCIAL

## 2021-11-24 NOTE — CONFIDENTIAL NOTE
Spoke with the patient and relayed information below.  He is tolerating PO. Patient will speak with his PCP in [place] for a referral for endoscopy and dilatation.  He has also met with a surgeon for hernia repair in [place] and will inquire if they can perform the procedure.  All questions were answered.      RE: see note in Epic  Received: Today  Carmen Mascorro PA-C Hartl, Dawn Ann, RN  Dr Castro said he is going to need Endoscopy with dilation.  They can do that in [place] or schedule again up here.  In the meantime stay hydrated.

## 2021-11-29 ENCOUNTER — TELEPHONE (OUTPATIENT)
Dept: ENDOCRINOLOGY | Facility: CLINIC | Age: 76
End: 2021-11-29
Payer: COMMERCIAL

## 2021-11-29 NOTE — TELEPHONE ENCOUNTER
Patient called stating he will be in town for a friend's  on 12/10 through  and wanted to have Dr. Castro do an endoscopy. I did tell Dai that Dr. Castro was not available either day. He said with that being the case he does have a general surgeon in [facility] who will do an endoscopy. Asked patient to sign a REJI so we can get the result.

## 2021-11-30 NOTE — TELEPHONE ENCOUNTER
Patient calling and would like to speak to Timothy Castro. Call patient at [phone]. OK to leave a detailed msg. LLUVIA   
Reason for call:  Other   Patient called regarding (reason for call): call back  Additional comments: Patient would like to discuss with MD Castro or care team a symptom of still not being able to keep food down. He was just in on 11/10 for a stent and ring removal. He is now in [place] at his winter home, and is wondering if he should see someone down there, or if this is a normal symptom after the removal. Please reach out to the patient to assist.     Phone number to reach patient:  Home number on file [phone] (home)    Best Time:  anytime    Can we leave a detailed message on this number?  YES    Travel screening: Not Applicable  
Spoke with patient.  Unable to get an appt for EGD in [place] when he will be here.  Is going to have an EGD in [place].  
07-Aug-2021 18:46

## 2021-12-01 ENCOUNTER — TELEPHONE (OUTPATIENT)
Dept: SURGERY | Facility: CLINIC | Age: 76
End: 2021-12-01
Payer: COMMERCIAL

## 2021-12-01 NOTE — TELEPHONE ENCOUNTER
[employer] message sent to patient with Dr. Castro's recommendation on follow-up:    Okay to wait until spring for repeat EGD.

## 2021-12-01 NOTE — TELEPHONE ENCOUNTER
[facility] Call Center    Phone Message    May a detailed message be left on voicemail: yes     Reason for Call: Other: Dai is calling in asking for a call back. He states that he has spoken with his general surgeon in [place], and they recommended that the patient does not have the procedure done in [place]. He would like to speak with Dr. Castro or a member of his care team about whether the procedure can wait until he comes back in the spring or if it would be best for him to make a trip back for it. Please call back as soon as possible to discuss.     Action Taken: Message routed to:  [facility] ([facility] Gen Surg    Travel Screening: Not Applicable

## 2021-12-18 ENCOUNTER — HEALTH MAINTENANCE LETTER (OUTPATIENT)
Age: 76
End: 2021-12-18

## 2022-05-24 NOTE — TELEPHONE ENCOUNTER
REFERRAL INFORMATION:    Referring Provider:  self    Referring Clinic:      Reason for Visit/Diagnosis: RET LENI       FUTURE VISIT INFORMATION:    Appointment Date: 6.2.22    Appointment Time: 10:30 AM     NOTES RECORD STATUS  DETAILS   OFFICE NOTE from Referring Provider     OFFICE NOTE from Other Specialists     HOSPITAL DISCHARGE SUMMARY/ ED VISITS      OPERATIVE REPORT     ENDOSCOPY (EGD)  Internal 11.10.21  10.15.21   PERTINENT LABS Internal    PATHOLOGY REPORTS (RELATED)     IMAGING (CT, MRI, US, XR)  Internal 9.18.21 CT ab pelvis

## 2022-06-02 ENCOUNTER — PREP FOR PROCEDURE (OUTPATIENT)
Dept: SURGERY | Facility: CLINIC | Age: 77
End: 2022-06-02
Payer: COMMERCIAL

## 2022-06-02 ENCOUNTER — PRE VISIT (OUTPATIENT)
Dept: ENDOCRINOLOGY | Facility: CLINIC | Age: 77
End: 2022-06-02
Payer: COMMERCIAL

## 2022-06-02 ENCOUNTER — TELEPHONE (OUTPATIENT)
Dept: SURGERY | Facility: CLINIC | Age: 77
End: 2022-06-02

## 2022-06-02 ENCOUNTER — VIRTUAL VISIT (OUTPATIENT)
Dept: ENDOCRINOLOGY | Facility: CLINIC | Age: 77
End: 2022-06-02
Payer: COMMERCIAL

## 2022-06-02 VITALS — BODY MASS INDEX: 31.08 KG/M2 | WEIGHT: 198 LBS | HEIGHT: 67 IN

## 2022-06-02 DIAGNOSIS — R13.19 ESOPHAGEAL DYSPHAGIA: Primary | ICD-10-CM

## 2022-06-02 DIAGNOSIS — K31.89 GASTRIC ANASTOMOTIC STRICTURE: Primary | ICD-10-CM

## 2022-06-02 DIAGNOSIS — K92.9 GASTRIC ANASTOMOTIC STRICTURE: Primary | ICD-10-CM

## 2022-06-02 PROCEDURE — 99024 POSTOP FOLLOW-UP VISIT: CPT | Performed by: SURGERY

## 2022-06-02 ASSESSMENT — PAIN SCALES - GENERAL: PAINLEVEL: NO PAIN (0)

## 2022-06-02 NOTE — TELEPHONE ENCOUNTER
Pt states provider never joined appt at 10:30 am. I called [employer] and they sent msg    [phone]

## 2022-06-02 NOTE — NURSING NOTE
"Chief Complaint   Patient presents with     Consult     [facility]       Vitals:    06/02/22 1009   Weight: 89.8 kg (198 lb)   Height: 1.702 m (5' 7\")       Body mass index is 31.01 kg/m .          ISAI EMANUEL EMT    "

## 2022-06-02 NOTE — LETTER
6/2/2022       RE: Dai Izquierdo  [address] [address] [address]     Dear Colleague,    Thank you for referring your patient, Dai Izquierdo, to the [facility]. Please see a copy of my visit note below.    No charge      Again, thank you for allowing me to participate in the care of your patient.      Sincerely,    Timothy Castro MD

## 2022-06-07 ENCOUNTER — TELEPHONE (OUTPATIENT)
Dept: ENDOCRINOLOGY | Facility: CLINIC | Age: 77
End: 2022-06-07
Payer: COMMERCIAL

## 2022-06-07 NOTE — TELEPHONE ENCOUNTER
Called patient to discuss dates for endoscopy with stent placement and endoscopy with stent removal 4 weeks later with Dr Castro.   Placement scheduled 6/28 at [place] and removal 7/26 at [place].   Patient is scheduled for his pre-op H&P at [facility] on June 24 with Daina GE, will also get COVID-19 testing same day.

## 2022-06-27 ENCOUNTER — ANESTHESIA EVENT (OUTPATIENT)
Dept: SURGERY | Facility: CLINIC | Age: 77
End: 2022-06-27
Payer: COMMERCIAL

## 2022-06-27 ENCOUNTER — TRANSFERRED RECORDS (OUTPATIENT)
Dept: HEALTH INFORMATION MANAGEMENT | Facility: CLINIC | Age: 77
End: 2022-06-27

## 2022-06-27 ENCOUNTER — PATIENT OUTREACH (OUTPATIENT)
Dept: ENDOCRINOLOGY | Facility: CLINIC | Age: 77
End: 2022-06-27

## 2022-06-27 ENCOUNTER — TELEPHONE (OUTPATIENT)
Dept: SURGERY | Facility: CLINIC | Age: 77
End: 2022-06-27

## 2022-06-27 NOTE — PROGRESS NOTES
Patient instructed NPO after midnight.  Can have clear liquids up to 3 hours before procedure.  To be to hospital by 1245 PM.    Stopped Plavix 7 days ago.    Okay to take all morning meds as procedure is in the afternoon.

## 2022-06-27 NOTE — TELEPHONE ENCOUNTER
Patient left  message concerned that no one had called him about surgery instructions, I.e., meds, soap to use etc. I spoke with the patient as I had sent a message to Noris STEVENSON RN to call him to discuss. His case is scheduled at 2:45 p.m., to arrive at the hospital at 12:45 p.m.

## 2022-06-28 ENCOUNTER — HOSPITAL ENCOUNTER (OUTPATIENT)
Facility: CLINIC | Age: 77
Discharge: HOME | End: 2022-06-28
Attending: SURGERY | Admitting: SURGERY
Payer: COMMERCIAL

## 2022-06-28 ENCOUNTER — ANESTHESIA (OUTPATIENT)
Dept: SURGERY | Facility: CLINIC | Age: 77
End: 2022-06-28
Payer: COMMERCIAL

## 2022-06-28 ENCOUNTER — APPOINTMENT (OUTPATIENT)
Dept: GENERAL RADIOLOGY | Facility: CLINIC | Age: 77
End: 2022-06-28
Attending: SURGERY
Payer: COMMERCIAL

## 2022-06-28 VITALS
HEIGHT: 66 IN | OXYGEN SATURATION: 94 % | RESPIRATION RATE: 18 BRPM | SYSTOLIC BLOOD PRESSURE: 160 MMHG | BODY MASS INDEX: 34.26 KG/M2 | DIASTOLIC BLOOD PRESSURE: 89 MMHG | TEMPERATURE: 97.8 F | HEART RATE: 66 BPM | WEIGHT: 213.19 LBS

## 2022-06-28 LAB — GLUCOSE BLDC GLUCOMTR-MCNC: 82 MG/DL (ref 70–99)

## 2022-06-28 PROCEDURE — 250N000025 HC SEVOFLURANE, PER MIN: Performed by: SURGERY

## 2022-06-28 PROCEDURE — 250N000011 HC RX IP 250 OP 636

## 2022-06-28 PROCEDURE — 360N000082 HC SURGERY LEVEL 2 W/ FLUORO, PER MIN: Performed by: SURGERY

## 2022-06-28 PROCEDURE — 258N000003 HC RX IP 258 OP 636

## 2022-06-28 PROCEDURE — C1889 IMPLANT/INSERT DEVICE, NOC: HCPCS | Performed by: SURGERY

## 2022-06-28 PROCEDURE — 370N000017 HC ANESTHESIA TECHNICAL FEE, PER MIN: Performed by: SURGERY

## 2022-06-28 PROCEDURE — 710N000010 HC RECOVERY PHASE 1, LEVEL 2, PER MIN: Performed by: SURGERY

## 2022-06-28 PROCEDURE — 82962 GLUCOSE BLOOD TEST: CPT

## 2022-06-28 PROCEDURE — 250N000009 HC RX 250: Performed by: NURSE ANESTHETIST, CERTIFIED REGISTERED

## 2022-06-28 PROCEDURE — 999N000179 XR SURGERY CARM FLUORO LESS THAN 5 MIN W STILLS: Mod: TC

## 2022-06-28 PROCEDURE — 250N000009 HC RX 250

## 2022-06-28 PROCEDURE — 43266 EGD ENDOSCOPIC STENT PLACE: CPT | Mod: GC | Performed by: SURGERY

## 2022-06-28 PROCEDURE — 272N000001 HC OR GENERAL SUPPLY STERILE: Performed by: SURGERY

## 2022-06-28 PROCEDURE — 710N000012 HC RECOVERY PHASE 2, PER MINUTE: Performed by: SURGERY

## 2022-06-28 PROCEDURE — C1769 GUIDE WIRE: HCPCS | Performed by: SURGERY

## 2022-06-28 PROCEDURE — 999N000141 HC STATISTIC PRE-PROCEDURE NURSING ASSESSMENT: Performed by: SURGERY

## 2022-06-28 PROCEDURE — 74360 X-RAY GUIDE GI DILATION: CPT | Mod: 26 | Performed by: SURGERY

## 2022-06-28 DEVICE — IMPLANTABLE DEVICE
Type: IMPLANTABLE DEVICE | Site: ESOPHAGUS | Status: NON-FUNCTIONAL
Brand: ENDOMAXX®
Removed: 2022-07-26

## 2022-06-28 RX ORDER — FENTANYL CITRATE 50 UG/ML
50 INJECTION, SOLUTION INTRAMUSCULAR; INTRAVENOUS EVERY 5 MIN PRN
Status: DISCONTINUED | OUTPATIENT
Start: 2022-06-28 | End: 2022-06-28 | Stop reason: HOSPADM

## 2022-06-28 RX ORDER — ONDANSETRON 2 MG/ML
INJECTION INTRAMUSCULAR; INTRAVENOUS PRN
Status: DISCONTINUED | OUTPATIENT
Start: 2022-06-28 | End: 2022-06-28

## 2022-06-28 RX ORDER — PROPOFOL 10 MG/ML
INJECTION, EMULSION INTRAVENOUS CONTINUOUS PRN
Status: DISCONTINUED | OUTPATIENT
Start: 2022-06-28 | End: 2022-06-28

## 2022-06-28 RX ORDER — SODIUM CHLORIDE, SODIUM LACTATE, POTASSIUM CHLORIDE, CALCIUM CHLORIDE 600; 310; 30; 20 MG/100ML; MG/100ML; MG/100ML; MG/100ML
INJECTION, SOLUTION INTRAVENOUS CONTINUOUS
Status: DISCONTINUED | OUTPATIENT
Start: 2022-06-28 | End: 2022-06-28 | Stop reason: HOSPADM

## 2022-06-28 RX ORDER — LIDOCAINE 40 MG/G
CREAM TOPICAL
Status: DISCONTINUED | OUTPATIENT
Start: 2022-06-28 | End: 2022-06-28 | Stop reason: HOSPADM

## 2022-06-28 RX ORDER — SODIUM CHLORIDE, SODIUM LACTATE, POTASSIUM CHLORIDE, CALCIUM CHLORIDE 600; 310; 30; 20 MG/100ML; MG/100ML; MG/100ML; MG/100ML
INJECTION, SOLUTION INTRAVENOUS CONTINUOUS PRN
Status: DISCONTINUED | OUTPATIENT
Start: 2022-06-28 | End: 2022-06-28

## 2022-06-28 RX ORDER — ACETAMINOPHEN 325 MG/1
650 TABLET ORAL
Status: DISCONTINUED | OUTPATIENT
Start: 2022-06-28 | End: 2022-06-28 | Stop reason: HOSPADM

## 2022-06-28 RX ORDER — LIDOCAINE HYDROCHLORIDE 20 MG/ML
INJECTION, SOLUTION INFILTRATION; PERINEURAL PRN
Status: DISCONTINUED | OUTPATIENT
Start: 2022-06-28 | End: 2022-06-28

## 2022-06-28 RX ORDER — LABETALOL HYDROCHLORIDE 5 MG/ML
10 INJECTION, SOLUTION INTRAVENOUS
Status: DISCONTINUED | OUTPATIENT
Start: 2022-06-28 | End: 2022-06-28 | Stop reason: HOSPADM

## 2022-06-28 RX ORDER — ONDANSETRON 4 MG/1
4 TABLET, ORALLY DISINTEGRATING ORAL EVERY 30 MIN PRN
Status: DISCONTINUED | OUTPATIENT
Start: 2022-06-28 | End: 2022-06-28 | Stop reason: HOSPADM

## 2022-06-28 RX ORDER — HYDROMORPHONE HCL IN WATER/PF 6 MG/30 ML
0.4 PATIENT CONTROLLED ANALGESIA SYRINGE INTRAVENOUS EVERY 5 MIN PRN
Status: DISCONTINUED | OUTPATIENT
Start: 2022-06-28 | End: 2022-06-28 | Stop reason: HOSPADM

## 2022-06-28 RX ORDER — PROPOFOL 10 MG/ML
INJECTION, EMULSION INTRAVENOUS PRN
Status: DISCONTINUED | OUTPATIENT
Start: 2022-06-28 | End: 2022-06-28

## 2022-06-28 RX ORDER — DEXAMETHASONE SODIUM PHOSPHATE 4 MG/ML
INJECTION, SOLUTION INTRA-ARTICULAR; INTRALESIONAL; INTRAMUSCULAR; INTRAVENOUS; SOFT TISSUE PRN
Status: DISCONTINUED | OUTPATIENT
Start: 2022-06-28 | End: 2022-06-28

## 2022-06-28 RX ORDER — OXYCODONE HYDROCHLORIDE 5 MG/1
5 TABLET ORAL EVERY 4 HOURS PRN
Status: DISCONTINUED | OUTPATIENT
Start: 2022-06-28 | End: 2022-06-28 | Stop reason: HOSPADM

## 2022-06-28 RX ORDER — ONDANSETRON 2 MG/ML
4 INJECTION INTRAMUSCULAR; INTRAVENOUS EVERY 30 MIN PRN
Status: DISCONTINUED | OUTPATIENT
Start: 2022-06-28 | End: 2022-06-28 | Stop reason: HOSPADM

## 2022-06-28 RX ADMIN — PROPOFOL 100 MG: 10 INJECTION, EMULSION INTRAVENOUS at 14:31

## 2022-06-28 RX ADMIN — SODIUM CHLORIDE, POTASSIUM CHLORIDE, SODIUM LACTATE AND CALCIUM CHLORIDE: 600; 310; 30; 20 INJECTION, SOLUTION INTRAVENOUS at 13:38

## 2022-06-28 RX ADMIN — SUGAMMADEX 200 MG: 100 INJECTION, SOLUTION INTRAVENOUS at 15:06

## 2022-06-28 RX ADMIN — ONDANSETRON 4 MG: 2 INJECTION INTRAMUSCULAR; INTRAVENOUS at 14:45

## 2022-06-28 RX ADMIN — DEXAMETHASONE SODIUM PHOSPHATE 8 MG: 4 INJECTION, SOLUTION INTRA-ARTICULAR; INTRALESIONAL; INTRAMUSCULAR; INTRAVENOUS; SOFT TISSUE at 14:31

## 2022-06-28 RX ADMIN — LIDOCAINE HYDROCHLORIDE 100 MG: 20 INJECTION, SOLUTION INFILTRATION; PERINEURAL at 14:31

## 2022-06-28 RX ADMIN — Medication 40 MG: at 14:31

## 2022-06-28 RX ADMIN — PROPOFOL 150 MCG/KG/MIN: 10 INJECTION, EMULSION INTRAVENOUS at 14:31

## 2022-06-28 RX ADMIN — SODIUM CHLORIDE, POTASSIUM CHLORIDE, SODIUM LACTATE AND CALCIUM CHLORIDE: 600; 310; 30; 20 INJECTION, SOLUTION INTRAVENOUS at 14:25

## 2022-06-28 ASSESSMENT — COPD QUESTIONNAIRES
COPD: 1
CAT_SEVERITY: MODERATE

## 2022-06-28 NOTE — ANESTHESIA PREPROCEDURE EVALUATION
Anesthesia Pre-Procedure Evaluation    Patient: Dai Izquierdo   MRN: [id number] : 1945        Procedure : Procedure(s):  ESOPHAGOGASTRODUODENOSCOPY, WITH ESOPHAGEAL STENT PLACEMENT          Past Medical History:   Diagnosis Date     Hypertension      PONV (postoperative nausea and vomiting)      Sleep apnea       Past Surgical History:   Procedure Laterality Date     APPENDECTOMY       COLONOSCOPY       COMBINED ESOPHAGOSCOPY, GASTROSCOPY, DUODENOSCOPY (EGD), REMOVE ESOPHAGEAL STENT N/A 11/10/2021    Procedure: ESOPHAGOGASTRODUODENOSCOPY, WITH ESOPHAGEAL STENT REMOVAL;  Surgeon: Timothy Castro MD;  Location: [facility] OR     GI SURGERY       ORTHOPEDIC SURGERY      Total hips, rotator cuff repairs      No Known Allergies   Social History     Tobacco Use     Smoking status: Former Smoker     Smokeless tobacco: Never Used   Substance Use Topics     Alcohol use: Never      Wt Readings from Last 1 Encounters:   22 89.8 kg (198 lb)        Anesthesia Evaluation   Pt has had prior anesthetic. Type: General and MAC.    History of anesthetic complications  - PONV.      ROS/MED HX  ENT/Pulmonary: Comment:   COVID pneumonia 2020, recovered.     (+) sleep apnea, moderate, uses CPAP, moderate,  COPD,     Neurologic: Comment:   In : TIA vs positional hypotension/sincopal episode that resolved with no neuro deficits. He was started on Plavix for stroke prevention then (since ).     (+) TIA (on plavix), date: ,     Cardiovascular: Comment:   Current meds:   Amlodipine - held DOS  Lipitor  Plavix    22 CT Angiogram  Mild calcified atherosclerotic changes of a normal caliber abdominal aorta and iliac arteries. Normal caliber celiac artery. Dense coil pack at the distal splenic artery. Normal enhancement of the splenic artery proximal and distal to the coil pack. No evidence for contrast enhancement within the aneurysm. Mild to moderate stenosis of the proximal superior mesenteric artery. Calcified  changes of the origins of the renal arteries without severe stenosis. Patent inferior mesenteric artery.     (+) Dyslipidemia hypertension-Peripheral Vascular Disease-- Other. ---    METS/Exercise Tolerance: >4 METS    Hematologic:  - neg hematologic  ROS     Musculoskeletal:   (+) arthritis,     GI/Hepatic: Comment:   *Bariatric Sx/vertical band gastroplasty in 1995.   *Now with food regurgitation due to partial gastric outlet obstruction. Lost 50 lbs since December 2020. Gastric Ring to be removed with EGD, and stent placed.  *GERD on omeprazole  *Diverticular disease, with h/o perforation 03/2008      (+) GERD, esophageal disease, Stricture,     Renal/Genitourinary: Comment:   *Baseline creat 1.13, with estimated GFR ~64  *BPH, on terazosin    (+) BPH,     Endo:  - neg endo ROS     Psychiatric/Substance Use:     (+) H/O chronic opiod use  (Daily Tylenol-codeine).     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;    - neg infectious disease ROS     Malignancy:  - neg malignancy ROS     Other:  - neg other ROS    (+) , H/O Chronic Pain,           OUTSIDE LABS:  CBC:   Lab Results   Component Value Date    WBC 7.4 09/19/2021    WBC 8.1 09/18/2021    HGB 13.7 09/19/2021    HGB 14.3 09/18/2021    HCT 41.9 09/19/2021    HCT 43.4 09/18/2021     09/19/2021     09/18/2021     BMP:   Lab Results   Component Value Date     09/19/2021     09/18/2021    POTASSIUM 4.0 09/21/2021    POTASSIUM 3.9 09/20/2021    CHLORIDE 105 09/19/2021    CHLORIDE 107 09/18/2021    CO2 25 09/19/2021    CO2 28 09/18/2021    BUN 11 09/19/2021    BUN 16 09/18/2021    CR 1.03 09/19/2021    CR 1.13 09/18/2021    GLC 92 11/10/2021    GLC 77 10/15/2021     COAGS: No results found for: PTT, INR, FIBR  POC: No results found for: BGM, HCG, HCGS  HEPATIC:   Lab Results   Component Value Date    ALBUMIN 3.0 (L) 09/19/2021    PROTTOTAL 5.8 (L)  09/19/2021    ALT 15 09/19/2021    AST 15 09/19/2021    ALKPHOS 76 09/19/2021    BILITOTAL 0.7 09/19/2021     OTHER:   Lab Results   Component Value Date    LACT 0.7 09/18/2021    KATHERINE 8.6 09/19/2021    PHOS 3.5 09/19/2021    MAG 1.8 09/21/2021    CRP 0.7 09/18/2021       Anesthesia Plan    ASA Status:  3   NPO Status:  NPO Appropriate    Anesthesia Type: General.     - Airway: ETT   Induction: Intravenous.   Maintenance: Balanced.        Consents    Anesthesia Plan(s) and associated risks, benefits, and realistic alternatives discussed. Questions answered and patient/representative(s) expressed understanding.     - Discussed: Risks, Benefits and Alternatives for BOTH SEDATION and the PROCEDURE were discussed     - Discussed with:  Patient      - Extended Intubation/Ventilatory Support Discussed: Yes.      - Patient is DNR/DNI Status: No    Use of blood products discussed: Yes.     - Discussed with: Patient.     - Consented: consented to blood products            Reason for refusal: other.     Postoperative Care    Pain management: IV analgesics, Oral pain medications, Multi-modal analgesia.   PONV prophylaxis: Ondansetron (or other 5HT-3), Dexamethasone or Solumedrol, Background Propofol Infusion     Comments:                Brian Moralez

## 2022-06-28 NOTE — ANESTHESIA CARE TRANSFER NOTE
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    Anesthesia Type:   General     Note:    Oropharynx: spontaneously breathing  Level of Consciousness: awake  Oxygen Supplementation: nasal cannula  Level of Supplemental Oxygen (L/min / FiO2): 4  Independent Airway: airway patency satisfactory and stable  Dentition: dentition unchanged  Vital Signs Stable: post-procedure vital signs reviewed and stable  Report to RN Given: handoff report given  Patient transferred to: PACU    Handoff Report: Identifed the Patient, Identified the Reponsible Provider, Reviewed the pertinent medical history, Discussed the surgical course, Reviewed Intra-OP anesthesia mangement and issues during anesthesia, Set expectations for post-procedure period and Allowed opportunity for questions and acknowledgement of understanding      Vitals:  Vitals Value Taken Time   /69 06/28/22 1520   Temp 35.5    Pulse 61 06/28/22 1523   Resp 14    SpO2 99 % 06/28/22 1523   Vitals shown include unvalidated device data.    Electronically Signed By: OPAL Landrum CRNA  June 28, 2022  3:24 PM

## 2022-06-28 NOTE — ANESTHESIA POSTPROCEDURE EVALUATION
Patient: Dai Izquierdo    Procedure: Procedure(s):  UPPER ENDOSCOPY WITH INTERPRETATION OF FLUOROSCOPY AND ESOPHAGEAL STENT PLACEMENT       Anesthesia Type:  General    Note:  Disposition: Outpatient   Postop Pain Control: Uneventful            Sign Out: Well controlled pain   PONV: No   Neuro/Psych: Uneventful            Sign Out: Acceptable/Baseline neuro status   Airway/Respiratory: Uneventful            Sign Out: Acceptable/Baseline resp. status   CV/Hemodynamics: Uneventful            Sign Out: Acceptable CV status; No obvious hypovolemia; No obvious fluid overload   Other NRE: NONE   DID A NON-ROUTINE EVENT OCCUR?            Last vitals:  Vitals Value Taken Time   /69 06/28/22 1520   Temp     Pulse 60 06/28/22 1526   Resp     SpO2 95 % 06/28/22 1526   Vitals shown include unvalidated device data.    Electronically Signed By: Jacob Rhodes MD  June 28, 2022  3:28 PM

## 2022-06-28 NOTE — OR NURSING
MD Pryor paged at 1600: CHANDRIKA Izquierdo- Is patient okay to resume Plavix? Thanks! Patricia [id number] or [phone]    Per MD okay to resume all medications including anticoagulation.

## 2022-06-28 NOTE — DISCHARGE INSTRUCTIONS
[facility]  Same-Day Surgery   Adult Discharge Orders & Instructions     For 24 hours after surgery    Get plenty of rest.  A responsible adult must stay with you for at least 24 hours after you leave the hospital.   Do not drive or use heavy equipment.  If you have weakness or tingling, don't drive or use heavy equipment until this feeling goes away.  Do not drink alcohol.  Avoid strenuous or risky activities.  Ask for help when climbing stairs.   You may feel lightheaded.  IF so, sit for a few minutes before standing.  Have someone help you get up.   If you have nausea (feel sick to your stomach): Drink only clear liquids such as apple juice, ginger ale, broth or 7-Up.  Rest may also help.  Be sure to drink enough fluids.  Move to a regular diet as you feel able.  You may have a slight fever. Call the doctor if your fever is over 100 F (37.7 C) (taken under the tongue) or lasts longer than 24 hours.  You may have a dry mouth, a sore throat, muscle aches or trouble sleeping.  These should go away after 24 hours.  Do not make important or legal decisions.   Call your doctor for any of the followin.  Signs of infection (fever, growing tenderness at the surgery site, a large amount of drainage or bleeding, severe pain, foul-smelling drainage, redness, swelling).    2. It has been over 8 to 10 hours since surgery and you are still not able to urinate (pass water).    3.  Headache for over 24 hours.    4.  Numbness, tingling or weakness the day after surgery (if you had spinal anesthesia).  To contact a doctor, call __Dr Castro's clinic at [phone]  during business hours Monday-Friday 8am-4:30pm__ or:    '   [phone] and ask for the resident on call for _general surgery__ (answered 24 hours a day)  '   Emergency Department:    [facility]: [phone]       (TTY for hearing impaired: [phone])    [facility]: [phone]       (TTY for hearing  impaired: [phone])

## 2022-06-28 NOTE — OP NOTE
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  1. Prior to stenting, the stricture measured 10 mm and was located at the mid-gastric body.  2. A stent was placed across the stricture with the proximal flare located at the mid-stomach.  3. The stent was visualized after placement using fluoroscopy and endoscopy.    Complications: None.   Implants: 23x70 covered wall [employer] stent     INDICATIONS FOR PROCEDURE  Dai Izquierdo is a 76 year old male who underwent prior vertical banded gastroplasty surgery.    There is a diagnosis of gastrointestinal stricture.    He has had ring removal using stents; has persistent stricture.    Height: 167.6 cm (5' 6\"), Weight: 96.7 kg (213 lb 3 oz), and currently the Body mass index is 34.41 kg/m ..      After understanding the risks and benefits of proceeding with an esophageal stent placement, he agreed to an operation as outlined by me.    I reviewed the risks of surgery with Dai Izquierdo and these include but are not limited to the following:    POTENTIAL COMPLICATIONS  Complications have been reported in the literature for esophageal stent placement with both silicone stents and expandable metal stents.     PROCEDURAL COMPLICATIONS:    Bleeding    Esophageal perforation    Pain    Aspiration    POST-STENT PLACEMENT COMPLICATIONS:    Stent migration    Perforation    Bleeding    Pain/foreign body sensation    Occlusion due to lesion growth    " Obstruction related to food volume    Infection    Reflux    Esophagitis    Esophageal ulceration    Edema    Fever    Fistula formation outside of normal disease progression    Death with cause outside of normal disease progression    DESCRIPTION OF PROCEDURE:   The patient was brought to the operating room, placed supine on the operating room table, and general anesthesia was induced.    A timeout was conducted with all members of the surgical team present to verify that the procedure and patient were correct.    The procedure was started by intubating the esophagus with a 10-mm adult endoscope.     The scope was advanced into the  gastric pouch.     In this manner, the stricture was traversed.    Fluoroscopy was used to guide placement of a radioopaque marker on the patient's abdomen at the location of the stricture.  A 0.035mm amplatz stiff wire was advanced through the gastroscope and under fluoroscopic guidance. The gastroscope was removed with the wire left in place and confirmation of positioning of the wire was by fluoroscopy.    Next, the stent described above was then placed across the stricture under fluoroscopic guidance.     The proximal flare was positioned in the mid-gastric body. The stent was deployed under radiologic guidance.    The gastroscope was passed back into the esophagus and the stent was visualized and positioning was confirmed with fluoroscopy.  The gastroscope was removed and the procedure was complete.    I was present for all critical portions of the operation.    Timothy Castro MD  Surgery  [phone] (hospital )  [phone] (clinic nurses)

## 2022-06-28 NOTE — ANESTHESIA PROCEDURE NOTES
Airway       Patient location during procedure: OR       Procedure Start/Stop Times: 6/28/2022 2:35 PM  Staff -        Anesthesiologist:  Jacob Rhodes MD       Resident/Fellow: Brian Moralez       Performed By: residentIndications and Patient Condition       Indications for airway management: pierre-procedural       Induction type:intravenous       Mask difficulty assessment: 1 - vent by mask    Final Airway Details       Final airway type: endotracheal airway       Successful airway: ETT - single  Endotracheal Airway Details        ETT size (mm): 7.0       Cuffed: yes       Successful intubation technique: video laryngoscopy and direct laryngoscopy       DL Blade Type: MAC 3       VL Blade Size: MAC 3       Grade View of Cords: 1       Adjucts: stylet and tooth guard       Position: Right       Measured from: gums/teeth       Secured at (cm): 22       Bite block used: Oral Airway    Post intubation assessment        Placement verified by: capnometry        Number of attempts at approach: 1       Ease of procedure: easy       Dentition: Intact and Unchanged    Medication(s) Administered   Medication Administration Time: 6/28/2022 2:35 PM

## 2022-06-30 ENCOUNTER — NURSE TRIAGE (OUTPATIENT)
Dept: NURSING | Facility: CLINIC | Age: 77
End: 2022-06-30

## 2022-06-30 ENCOUNTER — PATIENT OUTREACH (OUTPATIENT)
Dept: ENDOCRINOLOGY | Facility: CLINIC | Age: 77
End: 2022-06-30

## 2022-06-30 DIAGNOSIS — R13.10 DYSPHAGIA: Primary | ICD-10-CM

## 2022-06-30 RX ORDER — ONDANSETRON 4 MG/1
4 TABLET, ORALLY DISINTEGRATING ORAL EVERY 8 HOURS PRN
Qty: 40 TABLET | Refills: 0 | Status: SHIPPED | OUTPATIENT
Start: 2022-06-30

## 2022-06-30 NOTE — TELEPHONE ENCOUNTER
Nurse Triage SBAR    Is this a 2nd Level Triage?  Yes    Situation: Increase nausea     Background/Assessment:     On 6/28/2022  UPPER ENDOSCOPY WITH INTERPRETATION OF FLUOROSCOPY AND ESOPHAGEAL STENT PLACEMENT    Pt reporting, increase nausea since he had the surgical procedure.     Pt feels like he is going to throw up.  He goes to the restroom.  But nothing comes up.     He is nauseated all the time.   Feels like he has an upset stomach all the time.   Pt is burping a lot.  But it comes and goes.   Pt is passing gas.   But Pt was having issues with bowel movements.     Pt took some ex-lax last night.     Finally had a couple of bowels earlier today.      Now having some loose stools due to the Ex-lax.     But still feeling really nauseated.   NO fever. And pain level is 1/10 on the pain scale.       Pt is wondering if there is something for nausea.   Is there a tablet that he can place under his tongue for nausea.         Pt is staying in [facility].    So please send the medication order to [facility] in [facility] for Pt care.    Please call the Pt back @ [phone]    Mabel Rodriguez RN  Central Triage Red Flags/Med Refills        Protocol Recommended Disposition:   See Today Or Tomorrow In Office    Reason for Disposition    Patient wants to be seen    Additional Information    Negative: Sounds like a life-threatening emergency to the triager    Negative: Chest pain    Negative: Difficulty breathing    Negative: Acting confused (e.g., disoriented, slurred speech) or excessively sleepy    Negative: Surgical incision symptoms and questions    Negative: Discomfort (pain, burning or stinging) when passing urine and male    Negative: Discomfort (pain, burning or stinging) when passing urine and female    Negative: Constipation    Negative: New or worsening leg (calf, thigh) pain    Negative: New or worsening leg swelling    Negative: Dizziness is severe, or persists > 24 hours after  surgery    Negative: Symptoms arising from use of a urinary catheter (Hernandez or Coude)    Negative: Cast problems or questions    Negative: Medication question    Negative: Bright red, wide-spread, sunburn-like rash    Negative: SEVERE headache and after spinal (epidural) anesthesia    Negative: Vomiting and persists > 4 hours    Negative: Vomiting and abdomen looks much more swollen than usual    Negative: Drinking very little and dehydration suspected (e.g., no urine > 12 hours, very dry mouth, very lightheaded)    Negative: Patient sounds very sick or weak to the triager    Negative: Sounds like a serious complication to the triager    Negative: Fever > 100.4 F (38.0 C)    Negative: Caller has URGENT question and triager unable to answer question    Negative: SEVERE post-op pain (e.g., excruciating, pain scale 8-10) that is not controlled with pain medications    Negative: Headache and after spinal (epidural) anesthesia and not severe    Negative: Fever present > 3 days (72 hours)    Protocols used: POST-OP SYMPTOMS AND [id number]

## 2022-06-30 NOTE — TELEPHONE ENCOUNTER
RN Post-Op/Post-Discharge Care Coordination Note    Mr. Dai Izquierdo is a 76 year old male who underwent EGD with Stent Placement on 6/28/22 with  Dr. Lucius Castro.  Spoke with Patient.    Support  Patient able to care for self independently     Health Status  Nausea/Vomiting: Patient reports feeling nauseated. Zofran Rx sent to pharmacy.  Eating/drinking: Patient is only able to drink liquids.  Clear liquids at this point.  Bowel habits: Patient reports having a normal bowel movement.  Drains (RICO): N/A  Fevers/chills: Patient denies any fever or chills.  Incisions: NA.  Wound closure:  N/A  Pain: minimal  New Medications:  none    Activity/Restrictions  No lifting in excess of 15-20 pounds for 3-4 weeks    Equipment  None    Pathology reviewed with patient:  N/A    Forms/Letters  No    All of his questions were answered including reviewing restrictions, and wound care.  He will call this office if he has any further questions and/or concerns.      In lieu of a post-op clinic patient that patient would like to be contacted in approximately 7-10 days via telephone.    A copy of this note was routed to the primary surgeon.      Whom and When to Call  Patient acknowledges understanding of how to manage any medication changes and   when to seek medical care.     Patient advised that if after hour medical concerns arise to please call [phone] and choose option 4 to speak to the physician on call.

## 2022-06-30 NOTE — PROGRESS NOTES
RN Post-Op/Post-Discharge Care Coordination Note    Dai Izquierdo is a patient of Dr. Lucius Castro that underwent EGD with Stent Placement approximately 2 days ago (06/28).  Attempted to contact patient via telephone for a status update and review post-op  teaching.  LM on VM to call office.  Await return call.      Of note:  Pathology:  NA  Wound:  N/A  Follow-up:  Routine - 1 month for stent removal  Restrictions:  - No lifting, pushing, pulling more than 15-20 pounds for 3-4 weeks  New medications:  none  Equipment/Supplies:  None

## 2022-07-26 ENCOUNTER — ANESTHESIA (OUTPATIENT)
Dept: SURGERY | Facility: CLINIC | Age: 77
End: 2022-07-26
Payer: COMMERCIAL

## 2022-07-26 ENCOUNTER — ANESTHESIA EVENT (OUTPATIENT)
Dept: SURGERY | Facility: CLINIC | Age: 77
End: 2022-07-26
Payer: COMMERCIAL

## 2022-07-26 ENCOUNTER — HOSPITAL ENCOUNTER (OUTPATIENT)
Facility: CLINIC | Age: 77
Discharge: HOME | End: 2022-07-26
Attending: SURGERY | Admitting: SURGERY
Payer: COMMERCIAL

## 2022-07-26 VITALS
OXYGEN SATURATION: 96 % | DIASTOLIC BLOOD PRESSURE: 75 MMHG | WEIGHT: 207.01 LBS | HEIGHT: 67 IN | BODY MASS INDEX: 32.49 KG/M2 | SYSTOLIC BLOOD PRESSURE: 138 MMHG | TEMPERATURE: 97.7 F | RESPIRATION RATE: 14 BRPM | HEART RATE: 64 BPM

## 2022-07-26 LAB — GLUCOSE BLDC GLUCOMTR-MCNC: 86 MG/DL (ref 70–99)

## 2022-07-26 PROCEDURE — 82962 GLUCOSE BLOOD TEST: CPT

## 2022-07-26 PROCEDURE — 250N000009 HC RX 250: Performed by: NURSE ANESTHETIST, CERTIFIED REGISTERED

## 2022-07-26 PROCEDURE — 250N000011 HC RX IP 250 OP 636: Performed by: NURSE ANESTHETIST, CERTIFIED REGISTERED

## 2022-07-26 PROCEDURE — 999N000141 HC STATISTIC PRE-PROCEDURE NURSING ASSESSMENT: Performed by: SURGERY

## 2022-07-26 PROCEDURE — 43247 EGD REMOVE FOREIGN BODY: CPT | Performed by: SURGERY

## 2022-07-26 PROCEDURE — 360N000075 HC SURGERY LEVEL 2, PER MIN: Performed by: SURGERY

## 2022-07-26 PROCEDURE — 250N000011 HC RX IP 250 OP 636: Performed by: ANESTHESIOLOGY

## 2022-07-26 PROCEDURE — 250N000025 HC SEVOFLURANE, PER MIN: Performed by: SURGERY

## 2022-07-26 PROCEDURE — 710N000009 HC RECOVERY PHASE 1, LEVEL 1, PER MIN: Performed by: SURGERY

## 2022-07-26 PROCEDURE — 258N000003 HC RX IP 258 OP 636: Performed by: NURSE ANESTHETIST, CERTIFIED REGISTERED

## 2022-07-26 PROCEDURE — 370N000017 HC ANESTHESIA TECHNICAL FEE, PER MIN: Performed by: SURGERY

## 2022-07-26 PROCEDURE — 710N000012 HC RECOVERY PHASE 2, PER MINUTE: Performed by: SURGERY

## 2022-07-26 PROCEDURE — 272N000001 HC OR GENERAL SUPPLY STERILE: Performed by: SURGERY

## 2022-07-26 RX ORDER — ONDANSETRON 4 MG/1
4 TABLET, ORALLY DISINTEGRATING ORAL EVERY 30 MIN PRN
Status: DISCONTINUED | OUTPATIENT
Start: 2022-07-26 | End: 2022-07-26 | Stop reason: HOSPADM

## 2022-07-26 RX ORDER — NALOXONE HYDROCHLORIDE 0.4 MG/ML
0.2 INJECTION, SOLUTION INTRAMUSCULAR; INTRAVENOUS; SUBCUTANEOUS
Status: DISCONTINUED | OUTPATIENT
Start: 2022-07-26 | End: 2022-08-01 | Stop reason: HOSPADM

## 2022-07-26 RX ORDER — SODIUM CHLORIDE, SODIUM LACTATE, POTASSIUM CHLORIDE, CALCIUM CHLORIDE 600; 310; 30; 20 MG/100ML; MG/100ML; MG/100ML; MG/100ML
500 INJECTION, SOLUTION INTRAVENOUS CONTINUOUS
Status: DISCONTINUED | OUTPATIENT
Start: 2022-07-26 | End: 2022-07-26 | Stop reason: HOSPADM

## 2022-07-26 RX ORDER — OXYCODONE HYDROCHLORIDE 5 MG/1
5 TABLET ORAL EVERY 4 HOURS PRN
Status: DISCONTINUED | OUTPATIENT
Start: 2022-07-26 | End: 2022-08-01 | Stop reason: HOSPADM

## 2022-07-26 RX ORDER — FENTANYL CITRATE 50 UG/ML
25 INJECTION, SOLUTION INTRAMUSCULAR; INTRAVENOUS EVERY 5 MIN PRN
Status: DISCONTINUED | OUTPATIENT
Start: 2022-07-26 | End: 2022-07-26 | Stop reason: HOSPADM

## 2022-07-26 RX ORDER — LIDOCAINE HYDROCHLORIDE 20 MG/ML
INJECTION, SOLUTION INFILTRATION; PERINEURAL PRN
Status: DISCONTINUED | OUTPATIENT
Start: 2022-07-26 | End: 2022-07-26

## 2022-07-26 RX ORDER — LIDOCAINE 40 MG/G
CREAM TOPICAL
Status: DISCONTINUED | OUTPATIENT
Start: 2022-07-26 | End: 2022-07-26 | Stop reason: HOSPADM

## 2022-07-26 RX ORDER — NALOXONE HYDROCHLORIDE 0.4 MG/ML
0.4 INJECTION, SOLUTION INTRAMUSCULAR; INTRAVENOUS; SUBCUTANEOUS
Status: DISCONTINUED | OUTPATIENT
Start: 2022-07-26 | End: 2022-08-01 | Stop reason: HOSPADM

## 2022-07-26 RX ORDER — ONDANSETRON 2 MG/ML
INJECTION INTRAMUSCULAR; INTRAVENOUS PRN
Status: DISCONTINUED | OUTPATIENT
Start: 2022-07-26 | End: 2022-07-26

## 2022-07-26 RX ORDER — PROPOFOL 10 MG/ML
INJECTION, EMULSION INTRAVENOUS PRN
Status: DISCONTINUED | OUTPATIENT
Start: 2022-07-26 | End: 2022-07-26

## 2022-07-26 RX ORDER — SODIUM CHLORIDE, SODIUM LACTATE, POTASSIUM CHLORIDE, CALCIUM CHLORIDE 600; 310; 30; 20 MG/100ML; MG/100ML; MG/100ML; MG/100ML
INJECTION, SOLUTION INTRAVENOUS CONTINUOUS PRN
Status: DISCONTINUED | OUTPATIENT
Start: 2022-07-26 | End: 2022-07-26

## 2022-07-26 RX ORDER — PROCHLORPERAZINE MALEATE 5 MG
5 TABLET ORAL EVERY 6 HOURS PRN
Status: DISCONTINUED | OUTPATIENT
Start: 2022-07-26 | End: 2022-08-01 | Stop reason: HOSPADM

## 2022-07-26 RX ORDER — FENTANYL CITRATE 50 UG/ML
INJECTION, SOLUTION INTRAMUSCULAR; INTRAVENOUS PRN
Status: DISCONTINUED | OUTPATIENT
Start: 2022-07-26 | End: 2022-07-26

## 2022-07-26 RX ORDER — SODIUM CHLORIDE, SODIUM LACTATE, POTASSIUM CHLORIDE, CALCIUM CHLORIDE 600; 310; 30; 20 MG/100ML; MG/100ML; MG/100ML; MG/100ML
INJECTION, SOLUTION INTRAVENOUS CONTINUOUS
Status: DISCONTINUED | OUTPATIENT
Start: 2022-07-26 | End: 2022-07-26 | Stop reason: HOSPADM

## 2022-07-26 RX ORDER — ONDANSETRON 4 MG/1
4 TABLET, ORALLY DISINTEGRATING ORAL EVERY 6 HOURS PRN
Status: DISCONTINUED | OUTPATIENT
Start: 2022-07-26 | End: 2022-08-01 | Stop reason: HOSPADM

## 2022-07-26 RX ORDER — HYDROMORPHONE HCL IN WATER/PF 6 MG/30 ML
0.2 PATIENT CONTROLLED ANALGESIA SYRINGE INTRAVENOUS EVERY 5 MIN PRN
Status: DISCONTINUED | OUTPATIENT
Start: 2022-07-26 | End: 2022-07-26 | Stop reason: HOSPADM

## 2022-07-26 RX ORDER — ONDANSETRON 2 MG/ML
4 INJECTION INTRAMUSCULAR; INTRAVENOUS EVERY 30 MIN PRN
Status: DISCONTINUED | OUTPATIENT
Start: 2022-07-26 | End: 2022-07-26 | Stop reason: HOSPADM

## 2022-07-26 RX ORDER — AMLODIPINE AND BENAZEPRIL HYDROCHLORIDE 5; 10 MG/1; MG/1
CAPSULE ORAL DAILY
COMMUNITY
Start: 2022-06-13

## 2022-07-26 RX ORDER — ONDANSETRON 2 MG/ML
4 INJECTION INTRAMUSCULAR; INTRAVENOUS EVERY 6 HOURS PRN
Status: DISCONTINUED | OUTPATIENT
Start: 2022-07-26 | End: 2022-08-01 | Stop reason: HOSPADM

## 2022-07-26 RX ORDER — FLUMAZENIL 0.1 MG/ML
0.2 INJECTION, SOLUTION INTRAVENOUS
Status: ACTIVE | OUTPATIENT
Start: 2022-07-26 | End: 2022-07-26

## 2022-07-26 RX ADMIN — PROPOFOL 140 MG: 10 INJECTION, EMULSION INTRAVENOUS at 09:06

## 2022-07-26 RX ADMIN — LIDOCAINE HYDROCHLORIDE 50 MG: 20 INJECTION, SOLUTION INFILTRATION; PERINEURAL at 09:06

## 2022-07-26 RX ADMIN — FENTANYL CITRATE 50 MCG: 50 INJECTION, SOLUTION INTRAMUSCULAR; INTRAVENOUS at 09:06

## 2022-07-26 RX ADMIN — SODIUM CHLORIDE, POTASSIUM CHLORIDE, SODIUM LACTATE AND CALCIUM CHLORIDE: 600; 310; 30; 20 INJECTION, SOLUTION INTRAVENOUS at 09:01

## 2022-07-26 RX ADMIN — ROCURONIUM BROMIDE 50 MG: 50 INJECTION, SOLUTION INTRAVENOUS at 09:06

## 2022-07-26 RX ADMIN — FENTANYL CITRATE 50 MCG: 50 INJECTION, SOLUTION INTRAMUSCULAR; INTRAVENOUS at 09:32

## 2022-07-26 RX ADMIN — PHENYLEPHRINE HYDROCHLORIDE 100 MCG: 10 INJECTION INTRAVENOUS at 09:22

## 2022-07-26 RX ADMIN — PHENYLEPHRINE HYDROCHLORIDE 100 MCG: 10 INJECTION INTRAVENOUS at 09:33

## 2022-07-26 RX ADMIN — ONDANSETRON 4 MG: 2 INJECTION INTRAMUSCULAR; INTRAVENOUS at 09:14

## 2022-07-26 RX ADMIN — SUGAMMADEX 200 MG: 100 INJECTION, SOLUTION INTRAVENOUS at 09:40

## 2022-07-26 RX ADMIN — ONDANSETRON 4 MG: 2 INJECTION INTRAMUSCULAR; INTRAVENOUS at 10:15

## 2022-07-26 RX ADMIN — PHENYLEPHRINE HYDROCHLORIDE 100 MCG: 10 INJECTION INTRAVENOUS at 09:28

## 2022-07-26 ASSESSMENT — COPD QUESTIONNAIRES: COPD: 1

## 2022-07-26 ASSESSMENT — LIFESTYLE VARIABLES: TOBACCO_USE: 1

## 2022-07-26 NOTE — ANESTHESIA POSTPROCEDURE EVALUATION
Patient: Dai Izquierdo    Procedure: Procedure(s):  ESOPHAGOGASTRODUODENOSCOPY, WITH ESOPHAGEAL STENT REMOVAL       Anesthesia Type:  No value filed.    Note:  Disposition: Outpatient   Postop Pain Control: Uneventful            Sign Out: Well controlled pain   PONV: Yes            Symptoms: Nausea only            Sign Out: PONV/POV resolved with treatment   Neuro/Psych: Uneventful            Sign Out: Acceptable/Baseline neuro status   Airway/Respiratory: Uneventful            Sign Out: Acceptable/Baseline resp. status   CV/Hemodynamics: Uneventful            Sign Out: Acceptable CV status; No obvious hypovolemia; No obvious fluid overload   Other NRE: NONE   DID A NON-ROUTINE EVENT OCCUR? No           Last vitals:  Vitals Value Taken Time   /66 07/26/22 1000   Temp 36.1  C (97  F) 07/26/22 0950   Pulse 66 07/26/22 1016   Resp 22 07/26/22 1016   SpO2 97 % 07/26/22 1016   Vitals shown include unvalidated device data.    Electronically Signed By: Drake Emanuel MD  July 26, 2022  10:17 AM

## 2022-07-26 NOTE — ANESTHESIA PROCEDURE NOTES
Airway       Patient location during procedure: OR       Procedure Start/Stop Times: 7/26/2022 9:10 AM  Staff -        CRNA: Maria Esther Aguilar APRN CRNA       Performed By: CRNA  Consent for Airway        Urgency: elective  Indications and Patient Condition       Indications for airway management: pierre-procedural       Induction type:intravenous       Mask difficulty assessment: 1 - vent by mask    Final Airway Details       Final airway type: endotracheal airway       Successful airway: ETT - single  Endotracheal Airway Details        ETT size (mm): 7.5       Cuffed: yes       Successful intubation technique: direct laryngoscopy       DL Blade Type: Arteaga 2       Grade View of Cords: 1 (cords open/clear)       Adjucts: stylet       Position: Right       Measured from: lips       Secured at (cm): 22       Bite block used: None    Post intubation assessment        Placement verified by: capnometry, equal breath sounds and chest rise        Number of attempts at approach: 1       Number of other approaches attempted: 0       Ease of procedure: easy       Dentition: Intact and Unchanged    Medication(s) Administered   Medication Administration Time: 7/26/2022 9:10 AM

## 2022-07-26 NOTE — BRIEF OP NOTE
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      Complications: None.  Implants:   Implant Name Type Inv. Item Serial No.  Lot No. LRB No. Used Action   ENDOMAXX     [id number] N/A 1 Explanted

## 2022-07-26 NOTE — DISCHARGE INSTRUCTIONS
[facility]  Same-Day Surgery   Adult Discharge Orders & Instructions     For 24 hours after surgery    Get plenty of rest.  A responsible adult must stay with you for at least 24 hours after you leave the hospital.   Do not drive or use heavy equipment.  If you have weakness or tingling, don't drive or use heavy equipment until this feeling goes away.  Do not drink alcohol.  Avoid strenuous or risky activities.  Ask for help when climbing stairs.   You may feel lightheaded.  IF so, sit for a few minutes before standing.  Have someone help you get up.   If you have nausea (feel sick to your stomach): Drink only clear liquids such as apple juice, ginger ale, broth or 7-Up.  Rest may also help.  Be sure to drink enough fluids.  Move to a regular diet as you feel able.  You may have a slight fever. Call the doctor if your fever is over 100 F (37.7 C) (taken under the tongue) or lasts longer than 24 hours.  You may have a dry mouth, a sore throat, muscle aches or trouble sleeping.  These should go away after 24 hours.  Do not make important or legal decisions.   Call your doctor for any of the followin.  Signs of infection (fever, growing tenderness at the surgery site, a large amount of drainage or bleeding, severe pain, foul-smelling drainage, redness, swelling).    2. It has been over 8 to 10 hours since surgery and you are still not able to urinate (pass water).    3.  Headache for over 24 hours.    4.  Numbness, tingling or weakness the day after surgery (if you had spinal anesthesia).  To contact a doctor, call Dr aCstro's clinic at [phone]    or:    '   [phone] and ask for the resident on call for   General Surgery  (answered 24 hours a day)  '   Emergency Department:    [facility]: [phone]       (TTY for hearing impaired: [phone])    [facility]: [phone]       (TTY for hearing impaired: [phone])

## 2022-07-26 NOTE — ANESTHESIA CARE TRANSFER NOTE
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    Anesthesia Type:   No value filed.     Note:    Oropharynx: oropharynx clear of all foreign objects and spontaneously breathing  Level of Consciousness: awake  Oxygen Supplementation: face mask  Level of Supplemental Oxygen (L/min / FiO2): 4L  Independent Airway: airway patency satisfactory and stable  Dentition: dentition unchanged  Vital Signs Stable: post-procedure vital signs reviewed and stable  Report to RN Given: handoff report given  Patient transferred to: PACU    Handoff Report: Identifed the Patient, Identified the Reponsible Provider, Reviewed the pertinent medical history, Discussed the surgical course, Reviewed Intra-OP anesthesia mangement and issues during anesthesia, Set expectations for post-procedure period and Allowed opportunity for questions and acknowledgement of understanding      Vitals:  Vitals Value Taken Time   BP     Temp     Pulse 72 07/26/22 0950   Resp 9 07/26/22 0950   SpO2 95 % 07/26/22 0950   Vitals shown include unvalidated device data.    Electronically Signed By: OPAL Brar CRNA  July 26, 2022  9:52 AM

## 2022-07-26 NOTE — INTERVAL H&P NOTE
"I have reviewed the surgical (or preoperative) H&P that is linked to this encounter, and examined the patient. There are no significant changes    Clinical Conditions Present on Arrival:  Clinically Significant Risk Factors Present on Admission                  # Platelet Defect: home medication list includes an antiplatelet medication  # Obesity: Estimated body mass index is 32.42 kg/m  as calculated from the following:    Height as of this encounter: 1.702 m (5' 7\").    Weight as of this encounter: 93.9 kg (207 lb 0.2 oz).       "

## 2022-07-26 NOTE — ANESTHESIA PREPROCEDURE EVALUATION
Anesthesia Pre-Procedure Evaluation    Patient: Dai Izquierdo   MRN: [id number] : 1945        Procedure : Procedure(s):  ESOPHAGOGASTRODUODENOSCOPY, WITH ESOPHAGEAL STENT REMOVAL          Past Medical History:   Diagnosis Date     Arthritis      COPD (chronic obstructive pulmonary disease) (H)      Hypertension      PONV (postoperative nausea and vomiting)      Sleep apnea       Past Surgical History:   Procedure Laterality Date     APPENDECTOMY       COLONOSCOPY       COMBINED ESOPHAGOSCOPY, GASTROSCOPY, DUODENOSCOPY (EGD), REMOVE ESOPHAGEAL STENT N/A 11/10/2021    Procedure: ESOPHAGOGASTRODUODENOSCOPY, WITH ESOPHAGEAL STENT REMOVAL;  Surgeon: Timothy Castro MD;  Location: [facility] OR     COMBINED ESOPHAGOSCOPY, GASTROSCOPY, DUODENOSCOPY (EGD), REMOVE ESOPHAGEAL STENT N/A 2022    Procedure: UPPER ENDOSCOPY WITH INTERPRETATION OF FLUOROSCOPY AND ESOPHAGEAL STENT PLACEMENT;  Surgeon: Timothy Castro MD;  Location: [facility] OR     GI SURGERY       ORTHOPEDIC SURGERY      Total hips, rotator cuff repairs      No Known Allergies   Social History     Tobacco Use     Smoking status: Former Smoker     Smokeless tobacco: Never Used   Substance Use Topics     Alcohol use: Never      Wt Readings from Last 1 Encounters:   22 93.9 kg (207 lb 0.2 oz)        Anesthesia Evaluation   Pt has had prior anesthetic.     No history of anesthetic complications       ROS/MED HX  ENT/Pulmonary:     (+) sleep apnea, tobacco use, Past use, COPD,     Neurologic:     (+) CVA, date: ,     Cardiovascular:     (+) -----Taking blood thinners     METS/Exercise Tolerance: 3 - Able to walk 1-2 blocks without stopping Comment: Has supplimental oxygen that he uses as necessary with exertion   Hematologic:       Musculoskeletal:   (+) arthritis,     GI/Hepatic: Comment: SBO      Renal/Genitourinary:       Endo:       Psychiatric/Substance Use:       Infectious Disease:       Malignancy:       Other:            Physical  Exam    Airway  airway exam normal           Respiratory Devices and Support         Dental       (+) missing      Cardiovascular   cardiovascular exam normal          Pulmonary   pulmonary exam normal                OUTSIDE LABS:  CBC:   Lab Results   Component Value Date    WBC 7.4 09/19/2021    WBC 8.1 09/18/2021    HGB 13.7 09/19/2021    HGB 14.3 09/18/2021    HCT 41.9 09/19/2021    HCT 43.4 09/18/2021     09/19/2021     09/18/2021     BMP:   Lab Results   Component Value Date     09/19/2021     09/18/2021    POTASSIUM 4.0 09/21/2021    POTASSIUM 3.9 09/20/2021    CHLORIDE 105 09/19/2021    CHLORIDE 107 09/18/2021    CO2 25 09/19/2021    CO2 28 09/18/2021    BUN 11 09/19/2021    BUN 16 09/18/2021    CR 1.03 09/19/2021    CR 1.13 09/18/2021    GLC 86 07/26/2022    GLC 82 06/28/2022     COAGS: No results found for: PTT, INR, FIBR  POC: No results found for: BGM, HCG, HCGS  HEPATIC:   Lab Results   Component Value Date    ALBUMIN 3.0 (L) 09/19/2021    PROTTOTAL 5.8 (L) 09/19/2021    ALT 15 09/19/2021    AST 15 09/19/2021    ALKPHOS 76 09/19/2021    BILITOTAL 0.7 09/19/2021     OTHER:   Lab Results   Component Value Date    LACT 0.7 09/18/2021    KATHERINE 8.6 09/19/2021    PHOS 3.5 09/19/2021    MAG 1.8 09/21/2021    CRP 0.7 09/18/2021       Anesthesia Plan    ASA Status:  3   NPO Status:  NPO Appropriate    Anesthesia Type: General.     - Airway: ETT   Induction: Intravenous.   Maintenance: Balanced.        Consents    Anesthesia Plan(s) and associated risks, benefits, and realistic alternatives discussed. Questions answered and patient/representative(s) expressed understanding.    - Discussed:     - Discussed with:  Patient      - Extended Intubation/Ventilatory Support Discussed: No.      - Patient is DNR/DNI Status: No    Use of blood products discussed: No .     Postoperative Care    Pain management: IV analgesics.   PONV prophylaxis: Ondansetron (or other 5HT-3), Dexamethasone or  Solumedrol     Comments:                Levy Christie MD

## 2022-07-28 ENCOUNTER — PATIENT OUTREACH (OUTPATIENT)
Dept: SURGERY | Facility: CLINIC | Age: 77
End: 2022-07-28

## 2022-07-28 NOTE — PROGRESS NOTES
Spoke with patient after undergoing EGD, dilatation, and removal of stent.  He reports that he is doing well and is experiencing a sore throat.  Recommended keeping well hydrated and may use lozenges/hard candy PRN and this should resolve over the next couple of days.  He has restarted his home medications including Plavix.    Telephone follow up visit arranged with Dr. Castro on 8/11 at 1300.

## 2022-08-09 ENCOUNTER — TELEPHONE (OUTPATIENT)
Dept: SURGERY | Facility: CLINIC | Age: 77
End: 2022-08-09

## 2022-08-09 NOTE — TELEPHONE ENCOUNTER
Dai wanted Dr Castro to know that after the procedure, he's still 'plugged up when eating.'  He's okay waiting to hear from doc on their Thur appt.    [phone]  **OK to leave detailed VM

## 2022-08-10 NOTE — OP NOTE
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  1. An esophageal stent was found at the location of the lower stomach.  2. The stent was removed by grasping the proximal metal loop and pulling the stent retrograde under direct endoscopic visualization.  3. I visualized the area of the stent and the esophagus after stent removal and there was no evidence of esophageal injury.     Complications: None.   Implants: None.             COMORBIDITIES:   Past Medical History:   Diagnosis Date     Arthritis      COPD (chronic obstructive pulmonary disease) (H)      Hypertension      PONV (postoperative nausea and vomiting)      Sleep apnea        INDICATIONS FOR PROCEDURE  Dai Izquierdo is a 76 year old male who underwent prior VBG surgery.    An esophageal stent was previously placed to treat gastric stricture; the VBG ring had been removed; patient had persistent dysphagia and repeat stent was done to treat this.    Height: 170.2 cm (5' 7\"), Weight: 93.9 kg (207 lb 0.2 oz), and currently the Body mass index is 32.42 kg/m ..      After understanding the risks and benefits of proceeding with an esophageal stent placement, he agreed to an operation as outlined by me.    I reviewed the risks of surgery with Dai Izquierdo and these include but are not limited to the following:    POTENTIAL COMPLICATIONS  Complications have been reported in the literature for esophageal stent removal.     PROCEDURAL COMPLICATIONS:    Bleeding    " Esophageal or stomach perforation    Pain    Aspiration    Failure of stent to fix the problem intended    Reflux    Esophagitis    Esophageal ulceration    Edema    Fever    Fistula formation outside of normal disease progression    Death with cause outside of normal disease progression    DESCRIPTION OF PROCEDURE:   The patient was brought to the operating room, placed supine on the operating room table, and general anesthesia was induced.    A timeout was conducted with all members of the surgical team present to verify that the procedure and patient were correct.    The procedure was started by intubating the esophagus with a 9mm endoscope.     The scope was advanced into the upper gastric pouch.     An esophageal stent had migrated distally; I passed scope through the VBG outlet into the lower stomach and located the stent and removed it.    The stent was intact.    The gastroscope was passed back into the esophagus to evaluate for injury.    I evaluated the location of the previously placed stent and it appeared narrowed, but open.    The gastroscope was removed and the procedure was complete.    I was present for all portions of the operation.    Timothy Castro MD  Surgery  [phone] (hospital )  [phone] (clinic nurses)

## 2022-08-11 ENCOUNTER — VIRTUAL VISIT (OUTPATIENT)
Dept: SURGERY | Facility: CLINIC | Age: 77
End: 2022-08-11
Payer: COMMERCIAL

## 2022-08-11 VITALS — WEIGHT: 203 LBS | BODY MASS INDEX: 31.86 KG/M2 | HEIGHT: 67 IN

## 2022-08-11 DIAGNOSIS — K92.9 GASTRIC ANASTOMOTIC STRICTURE: Primary | ICD-10-CM

## 2022-08-11 DIAGNOSIS — K31.89 GASTRIC ANASTOMOTIC STRICTURE: Primary | ICD-10-CM

## 2022-08-11 PROCEDURE — 99212 OFFICE O/P EST SF 10 MIN: CPT | Mod: TEL | Performed by: SURGERY

## 2022-08-11 ASSESSMENT — PAIN SCALES - GENERAL: PAINLEVEL: NO PAIN (0)

## 2022-08-11 NOTE — PROGRESS NOTES
"dai is a 76 year old who is being evaluated via a billable video visit.      How would you like to obtain your AVS? MyChart  If the video visit is dropped, the invitation should be resent by:  [phone]  Will anyone else be joining your video visit? No    During this virtual visit the patient is located in [place], patient verifies this as the location during the entirety of this visit.     Video-Visit Details    Telephone Start Time: 130pm    Type of service:  Telephone Visit    Telephone End Time: 142pm    Originating Location (pt. Location): Home    Distant Location (provider location):  [facility]     Dai Izquierdo is a 76 year old male who is being evaluated via a billable telephone visit.      The patient has been notified of following:     \"This telephone visit will be conducted via a call between you and your physician/provider. We have found that certain health care needs can be provided without the need for a physical exam.  This service lets us provide the care you need with a short phone conversation.  If a prescription is necessary we can send it directly to your pharmacy.  If lab work is needed we can place an order for that and you can then stop by our lab to have the test done at a later time.    If during the course of the call the physician/provider feels a telephone visit is not appropriate, you will not be charged for this service.\"       Dai Izquierdo complains of persistent dysphagia.    I removed VBG ring previously.  Had residual stricture symptoms (dysphagia/heartburn); repeated stent placement; stent migrated through stricture, but stricture remained tight.  Stent has been removed.        Chief Complaint   Patient presents with     RECHECK     After EGD/removal of stent       I have reviewed and updated the patient's Past Medical History, Social History, Family History and Medication List.    ALLERGIES  Patient has no known " "allergies.    Additional provider notes:       I spoke with Dai at some length about doing an endoscopic assisted stapling of his gastric stricture.  This would require a gastrostomy tube (G-tube for ~4 weeks); the procedure requires a 2cm incision on the upper abdomen.    Risks discussed.      Assessment/Plan:  esophageal dysphagia with gastric stricture    Will do \"endoscopic assisted transgastric strictureplasty with gastrostomy tube\".  75 minutes surgery time.  GETA.  PAC for preop.  Call [facility] at [phone] for scheduling.            Phone call duration: 12 minutes    Timothy Castro MD  Surgery  [phone] (hospital )  [phone] (clinic nurses)                "

## 2022-08-11 NOTE — NURSING NOTE
"Chief Complaint   Patient presents with     RECHECK     After EGD/removal of stent       Vitals:    08/11/22 1124   Weight: 92.1 kg (203 lb)   Height: 1.702 m (5' 7\")       Body mass index is 31.79 kg/m .                          Nikolas Yoo, EMT    "

## 2022-08-11 NOTE — LETTER
"8/11/2022       RE: Dai Izquierdo  [address] [facility] [address]     Dear Colleague,    Thank you for referring your patient, Dai Izquierdo, to the [facility] GENERAL SURGERY CLINIC [place] at [facility]. Please see a copy of my visit note below.      Dai Izquierdo complains of persistent dysphagia.    I removed VBG ring previously.  Had residual stricture symptoms (dysphagia/heartburn); repeated stent placement; stent migrated through stricture, but stricture remained tight.  Stent has been removed.        Chief Complaint   Patient presents with     RECHECK     After EGD/removal of stent       I have reviewed and updated the patient's Past Medical History, Social History, Family History and Medication List.    ALLERGIES  Patient has no known allergies.    Additional provider notes:       I spoke with Dai at some length about doing an endoscopic assisted stapling of his gastric stricture.  This would require a gastrostomy tube (G-tube for ~4 weeks); the procedure requires a 2cm incision on the upper abdomen.    Risks discussed.      Assessment/Plan:  esophageal dysphagia with gastric stricture    Will do \"endoscopic assisted transgastric strictureplasty with gastrostomy tube\".  75 minutes surgery time.  GETA.  PAC for preop.  Call [facility] at [phone] for scheduling.            Phone call duration: 12 minutes    Timothy Castro MD  Surgery  [phone] (hospital )  [phone] (clinic nurses)  "

## 2022-08-12 ENCOUNTER — TELEPHONE (OUTPATIENT)
Dept: SURGERY | Facility: CLINIC | Age: 77
End: 2022-08-12

## 2022-08-12 ENCOUNTER — PREP FOR PROCEDURE (OUTPATIENT)
Dept: SURGERY | Facility: CLINIC | Age: 77
End: 2022-08-12

## 2022-08-12 DIAGNOSIS — K92.9 GASTRIC ANASTOMOTIC STRICTURE: Primary | ICD-10-CM

## 2022-08-12 DIAGNOSIS — K31.89 GASTRIC ANASTOMOTIC STRICTURE: Primary | ICD-10-CM

## 2022-08-12 NOTE — TELEPHONE ENCOUNTER
Patient called to discuss surgery date for endoscopic-assisted transgastric stricturoplasty with G-tube placement with Dr. Castro, clinic consult yesterday. Scheduled Friday, 8/19 at 10 a.m. [place], to arrive at 8 a.m.   Patient will schedule his pre-op H&P with his primary care on Tuesday, 8/16 and get COVID-19 lab test that day as well.

## 2022-08-18 ENCOUNTER — ANESTHESIA EVENT (OUTPATIENT)
Dept: SURGERY | Facility: CLINIC | Age: 77
End: 2022-08-18
Payer: COMMERCIAL

## 2022-08-19 ENCOUNTER — HOSPITAL ENCOUNTER (OUTPATIENT)
Facility: CLINIC | Age: 77
Discharge: HOME | End: 2022-08-19
Attending: SURGERY | Admitting: SURGERY
Payer: COMMERCIAL

## 2022-08-19 ENCOUNTER — APPOINTMENT (OUTPATIENT)
Dept: GENERAL RADIOLOGY | Facility: CLINIC | Age: 77
End: 2022-08-19
Attending: SURGERY
Payer: COMMERCIAL

## 2022-08-19 ENCOUNTER — ANESTHESIA (OUTPATIENT)
Dept: SURGERY | Facility: CLINIC | Age: 77
End: 2022-08-19
Payer: COMMERCIAL

## 2022-08-19 ENCOUNTER — TELEPHONE (OUTPATIENT)
Dept: SURGERY | Facility: CLINIC | Age: 77
End: 2022-08-19

## 2022-08-19 VITALS
HEIGHT: 67 IN | RESPIRATION RATE: 16 BRPM | WEIGHT: 202.82 LBS | DIASTOLIC BLOOD PRESSURE: 78 MMHG | HEART RATE: 69 BPM | BODY MASS INDEX: 31.83 KG/M2 | SYSTOLIC BLOOD PRESSURE: 150 MMHG | TEMPERATURE: 97.3 F | OXYGEN SATURATION: 93 %

## 2022-08-19 DIAGNOSIS — K56.609 SBO (SMALL BOWEL OBSTRUCTION) (H): Primary | ICD-10-CM

## 2022-08-19 LAB — GLUCOSE BLDC GLUCOMTR-MCNC: 96 MG/DL (ref 70–99)

## 2022-08-19 PROCEDURE — 250N000009 HC RX 250: Performed by: NURSE ANESTHETIST, CERTIFIED REGISTERED

## 2022-08-19 PROCEDURE — 710N000009 HC RECOVERY PHASE 1, LEVEL 1, PER MIN: Performed by: SURGERY

## 2022-08-19 PROCEDURE — 250N000011 HC RX IP 250 OP 636: Performed by: SURGERY

## 2022-08-19 PROCEDURE — 360N000082 HC SURGERY LEVEL 2 W/ FLUORO, PER MIN: Performed by: SURGERY

## 2022-08-19 PROCEDURE — 82962 GLUCOSE BLOOD TEST: CPT

## 2022-08-19 PROCEDURE — 272N000001 HC OR GENERAL SUPPLY STERILE: Performed by: SURGERY

## 2022-08-19 PROCEDURE — C1769 GUIDE WIRE: HCPCS | Performed by: SURGERY

## 2022-08-19 PROCEDURE — C1894 INTRO/SHEATH, NON-LASER: HCPCS | Performed by: SURGERY

## 2022-08-19 PROCEDURE — 999N000141 HC STATISTIC PRE-PROCEDURE NURSING ASSESSMENT: Performed by: SURGERY

## 2022-08-19 PROCEDURE — 250N000011 HC RX IP 250 OP 636: Performed by: STUDENT IN AN ORGANIZED HEALTH CARE EDUCATION/TRAINING PROGRAM

## 2022-08-19 PROCEDURE — 49440 PLACE GASTROSTOMY TUBE PERC: CPT | Mod: GC | Performed by: SURGERY

## 2022-08-19 PROCEDURE — 999N000179 XR SURGERY CARM FLUORO LESS THAN 5 MIN W STILLS: Mod: TC

## 2022-08-19 PROCEDURE — 370N000017 HC ANESTHESIA TECHNICAL FEE, PER MIN: Performed by: SURGERY

## 2022-08-19 PROCEDURE — 710N000012 HC RECOVERY PHASE 2, PER MINUTE: Performed by: SURGERY

## 2022-08-19 PROCEDURE — 258N000003 HC RX IP 258 OP 636: Performed by: NURSE ANESTHETIST, CERTIFIED REGISTERED

## 2022-08-19 PROCEDURE — 250N000013 HC RX MED GY IP 250 OP 250 PS 637: Performed by: STUDENT IN AN ORGANIZED HEALTH CARE EDUCATION/TRAINING PROGRAM

## 2022-08-19 PROCEDURE — 250N000011 HC RX IP 250 OP 636: Performed by: NURSE ANESTHETIST, CERTIFIED REGISTERED

## 2022-08-19 PROCEDURE — 250N000025 HC SEVOFLURANE, PER MIN: Performed by: SURGERY

## 2022-08-19 RX ORDER — SODIUM CHLORIDE, SODIUM LACTATE, POTASSIUM CHLORIDE, CALCIUM CHLORIDE 600; 310; 30; 20 MG/100ML; MG/100ML; MG/100ML; MG/100ML
INJECTION, SOLUTION INTRAVENOUS CONTINUOUS PRN
Status: DISCONTINUED | OUTPATIENT
Start: 2022-08-19 | End: 2022-08-19

## 2022-08-19 RX ORDER — FENTANYL CITRATE 50 UG/ML
25 INJECTION, SOLUTION INTRAMUSCULAR; INTRAVENOUS EVERY 5 MIN PRN
Status: DISCONTINUED | OUTPATIENT
Start: 2022-08-19 | End: 2022-08-19 | Stop reason: HOSPADM

## 2022-08-19 RX ORDER — DEXAMETHASONE SODIUM PHOSPHATE 4 MG/ML
INJECTION, SOLUTION INTRA-ARTICULAR; INTRALESIONAL; INTRAMUSCULAR; INTRAVENOUS; SOFT TISSUE PRN
Status: DISCONTINUED | OUTPATIENT
Start: 2022-08-19 | End: 2022-08-19

## 2022-08-19 RX ORDER — FENTANYL CITRATE 50 UG/ML
25 INJECTION, SOLUTION INTRAMUSCULAR; INTRAVENOUS
Status: CANCELLED | OUTPATIENT
Start: 2022-08-19

## 2022-08-19 RX ORDER — CEFAZOLIN SODIUM/WATER 2 G/20 ML
2 SYRINGE (ML) INTRAVENOUS SEE ADMIN INSTRUCTIONS
Status: DISCONTINUED | OUTPATIENT
Start: 2022-08-19 | End: 2022-08-19 | Stop reason: HOSPADM

## 2022-08-19 RX ORDER — LIDOCAINE 40 MG/G
CREAM TOPICAL
Status: DISCONTINUED | OUTPATIENT
Start: 2022-08-19 | End: 2022-08-19 | Stop reason: HOSPADM

## 2022-08-19 RX ORDER — ONDANSETRON 4 MG/1
4 TABLET, ORALLY DISINTEGRATING ORAL EVERY 30 MIN PRN
Status: DISCONTINUED | OUTPATIENT
Start: 2022-08-19 | End: 2022-08-19 | Stop reason: HOSPADM

## 2022-08-19 RX ORDER — LIDOCAINE HYDROCHLORIDE 20 MG/ML
INJECTION, SOLUTION INFILTRATION; PERINEURAL PRN
Status: DISCONTINUED | OUTPATIENT
Start: 2022-08-19 | End: 2022-08-19

## 2022-08-19 RX ORDER — OXYCODONE HYDROCHLORIDE 5 MG/1
5 TABLET ORAL EVERY 4 HOURS PRN
Status: DISCONTINUED | OUTPATIENT
Start: 2022-08-19 | End: 2022-08-19 | Stop reason: HOSPADM

## 2022-08-19 RX ORDER — CEFAZOLIN SODIUM/WATER 2 G/20 ML
2 SYRINGE (ML) INTRAVENOUS
Status: COMPLETED | OUTPATIENT
Start: 2022-08-19 | End: 2022-08-19

## 2022-08-19 RX ORDER — ONDANSETRON 2 MG/ML
INJECTION INTRAMUSCULAR; INTRAVENOUS PRN
Status: DISCONTINUED | OUTPATIENT
Start: 2022-08-19 | End: 2022-08-19

## 2022-08-19 RX ORDER — MEPERIDINE HYDROCHLORIDE 25 MG/ML
12.5 INJECTION INTRAMUSCULAR; INTRAVENOUS; SUBCUTANEOUS
Status: DISCONTINUED | OUTPATIENT
Start: 2022-08-19 | End: 2022-08-19 | Stop reason: HOSPADM

## 2022-08-19 RX ORDER — HYDROMORPHONE HCL IN WATER/PF 6 MG/30 ML
0.2 PATIENT CONTROLLED ANALGESIA SYRINGE INTRAVENOUS EVERY 5 MIN PRN
Status: DISCONTINUED | OUTPATIENT
Start: 2022-08-19 | End: 2022-08-19 | Stop reason: HOSPADM

## 2022-08-19 RX ORDER — PROPOFOL 10 MG/ML
INJECTION, EMULSION INTRAVENOUS PRN
Status: DISCONTINUED | OUTPATIENT
Start: 2022-08-19 | End: 2022-08-19

## 2022-08-19 RX ORDER — ONDANSETRON 2 MG/ML
4 INJECTION INTRAMUSCULAR; INTRAVENOUS EVERY 30 MIN PRN
Status: DISCONTINUED | OUTPATIENT
Start: 2022-08-19 | End: 2022-08-19 | Stop reason: HOSPADM

## 2022-08-19 RX ORDER — SODIUM CHLORIDE, SODIUM LACTATE, POTASSIUM CHLORIDE, CALCIUM CHLORIDE 600; 310; 30; 20 MG/100ML; MG/100ML; MG/100ML; MG/100ML
INJECTION, SOLUTION INTRAVENOUS CONTINUOUS
Status: DISCONTINUED | OUTPATIENT
Start: 2022-08-19 | End: 2022-08-19 | Stop reason: HOSPADM

## 2022-08-19 RX ORDER — FENTANYL CITRATE 50 UG/ML
INJECTION, SOLUTION INTRAMUSCULAR; INTRAVENOUS PRN
Status: DISCONTINUED | OUTPATIENT
Start: 2022-08-19 | End: 2022-08-19

## 2022-08-19 RX ORDER — OXYCODONE HYDROCHLORIDE 5 MG/1
5 TABLET ORAL EVERY 6 HOURS PRN
Qty: 20 TABLET | Refills: 0 | Status: SHIPPED | OUTPATIENT
Start: 2022-08-19 | End: 2022-08-24

## 2022-08-19 RX ORDER — EPHEDRINE SULFATE 50 MG/ML
INJECTION, SOLUTION INTRAMUSCULAR; INTRAVENOUS; SUBCUTANEOUS PRN
Status: DISCONTINUED | OUTPATIENT
Start: 2022-08-19 | End: 2022-08-19

## 2022-08-19 RX ADMIN — SODIUM CHLORIDE, POTASSIUM CHLORIDE, SODIUM LACTATE AND CALCIUM CHLORIDE: 600; 310; 30; 20 INJECTION, SOLUTION INTRAVENOUS at 10:12

## 2022-08-19 RX ADMIN — PHENYLEPHRINE HYDROCHLORIDE 100 MCG: 10 INJECTION INTRAVENOUS at 10:53

## 2022-08-19 RX ADMIN — PHENYLEPHRINE HYDROCHLORIDE 100 MCG: 10 INJECTION INTRAVENOUS at 10:58

## 2022-08-19 RX ADMIN — Medication 10 MG: at 11:14

## 2022-08-19 RX ADMIN — OXYCODONE HYDROCHLORIDE 5 MG: 5 TABLET ORAL at 13:31

## 2022-08-19 RX ADMIN — FENTANYL CITRATE 25 MCG: 50 INJECTION, SOLUTION INTRAMUSCULAR; INTRAVENOUS at 12:57

## 2022-08-19 RX ADMIN — PROPOFOL 160 MG: 10 INJECTION, EMULSION INTRAVENOUS at 10:31

## 2022-08-19 RX ADMIN — FENTANYL CITRATE 25 MCG: 50 INJECTION, SOLUTION INTRAMUSCULAR; INTRAVENOUS at 13:04

## 2022-08-19 RX ADMIN — FENTANYL CITRATE 25 MCG: 50 INJECTION, SOLUTION INTRAMUSCULAR; INTRAVENOUS at 12:52

## 2022-08-19 RX ADMIN — SUGAMMADEX 200 MG: 100 INJECTION, SOLUTION INTRAVENOUS at 12:22

## 2022-08-19 RX ADMIN — FENTANYL CITRATE 25 MCG: 50 INJECTION, SOLUTION INTRAMUSCULAR; INTRAVENOUS at 13:15

## 2022-08-19 RX ADMIN — LIDOCAINE HYDROCHLORIDE 100 MG: 20 INJECTION, SOLUTION INFILTRATION; PERINEURAL at 10:31

## 2022-08-19 RX ADMIN — DEXAMETHASONE SODIUM PHOSPHATE 4 MG: 4 INJECTION, SOLUTION INTRA-ARTICULAR; INTRALESIONAL; INTRAMUSCULAR; INTRAVENOUS; SOFT TISSUE at 10:39

## 2022-08-19 RX ADMIN — Medication 10 MG: at 11:41

## 2022-08-19 RX ADMIN — FENTANYL CITRATE 50 MCG: 50 INJECTION, SOLUTION INTRAMUSCULAR; INTRAVENOUS at 11:23

## 2022-08-19 RX ADMIN — Medication 2 G: at 10:20

## 2022-08-19 RX ADMIN — Medication 5 MG: at 11:44

## 2022-08-19 RX ADMIN — Medication 10 MG: at 12:21

## 2022-08-19 RX ADMIN — ONDANSETRON 4 MG: 2 INJECTION INTRAMUSCULAR; INTRAVENOUS at 10:40

## 2022-08-19 RX ADMIN — FENTANYL CITRATE 50 MCG: 50 INJECTION, SOLUTION INTRAMUSCULAR; INTRAVENOUS at 10:31

## 2022-08-19 RX ADMIN — SUGAMMADEX 200 MG: 100 INJECTION, SOLUTION INTRAVENOUS at 12:19

## 2022-08-19 RX ADMIN — PHENYLEPHRINE HYDROCHLORIDE 100 MCG: 10 INJECTION INTRAVENOUS at 11:01

## 2022-08-19 RX ADMIN — Medication 50 MG: at 10:32

## 2022-08-19 RX ADMIN — PHENYLEPHRINE HYDROCHLORIDE 100 MCG: 10 INJECTION INTRAVENOUS at 11:46

## 2022-08-19 ASSESSMENT — LIFESTYLE VARIABLES: TOBACCO_USE: 1

## 2022-08-19 ASSESSMENT — ACTIVITIES OF DAILY LIVING (ADL)
ADLS_ACUITY_SCORE: 35

## 2022-08-19 ASSESSMENT — COPD QUESTIONNAIRES: COPD: 1

## 2022-08-19 NOTE — H&P
"[facility] Anesthesia Pre-op History and Physical    Dai Izquierdo MRN# [id number]   Age: 76 year old YOB: 1945      Date of Surgery: 08/19/22   [facility]      Date of Exam 8/19/2022 Facility (Same day)       Home clinic: n/a  Primary care provider: Saeid Mehta         Chief Complaint and/or Reason for Procedure:   No chief complaint on file.           Active problem list:     Patient Active Problem List    Diagnosis Date Noted     SBO (small bowel obstruction) (H) 09/18/2021     Priority: Medium            Medications (include herbals and vitamins):   Any Plavix use in the last 7 days? No     Current Facility-Administered Medications   Medication     ceFAZolin Sodium (ANCEF) injection 2 g     ceFAZolin Sodium (ANCEF) injection 2 g     lactated ringers infusion     lidocaine (LMX4) cream     lidocaine 1 % 0.1-1 mL     sodium chloride (PF) 0.9% PF flush 3 mL     sodium chloride (PF) 0.9% PF flush 3 mL             Allergies:    No Known Allergies  Allergy to Latex? No  Allergy to tape?   No  Intolerances: none            Physical Exam:   All vitals have been reviewed  Patient Vitals for the past 8 hrs:   BP Temp Temp src Pulse Resp SpO2 Height Weight   08/19/22 0822 (!) 142/74 97.9  F (36.6  C) Oral 60 18 96 % 1.702 m (5' 7\") 92 kg (202 lb 13.2 oz)     No intake/output data recorded.  Airway assessment:   Patient is able to open mouth wide  Patient is able to stick out tongue}              Lab / Radiology Results:   Guidelines for CXR: new or unstable cardio-pulmonary disease         Anesthetic risk and/or ASA classification:     ASA III  GETA        Timothy Castro MD     Plan upper endoscopy with transgastric strictureplasty; g-tube at end.    All risks discussed with patient; wife aware of plan and the plan for g-tube at the end of the case.    Timothy Castro MD  Surgery  [phone] (hospital )  [phone] (clinic nurses)    "

## 2022-08-19 NOTE — BRIEF OP NOTE
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 Otherwise normal intraluminal anatomy. 2 gold load staplers used to open stricture and then transgastric g-tube placed with fluoroscopy. .  Complications: None.  Implants: * No implants in log *   -G-tube placed and sutured down.    Ludin Negron, PGY-1  General Surgery Resident   Pager: [phone]

## 2022-08-19 NOTE — OR NURSING
Provider paged:    Pt CHANDRIKA Izquierdo Needs care instructions for G tube, please call Elsie GRECO RN [phone].  Thank you.     Dr. Negron at bedside, care instructions for G-tube clarified; pt and spouse given discharge instructions, wound care instructions, questions answered.      Pt given supplies to care for G tube site.

## 2022-08-19 NOTE — ANESTHESIA PREPROCEDURE EVALUATION
Anesthesia Pre-Procedure Evaluation    Patient: Dai Izquierdo   MRN: [id number] : 1945        Procedure : Procedure(s):  Endoscopic-assisted transgastric strictureplasty with gastrostomy tube          Past Medical History:   Diagnosis Date     Arthritis      COPD (chronic obstructive pulmonary disease) (H)      Hypertension      PONV (postoperative nausea and vomiting)      Sleep apnea       Past Surgical History:   Procedure Laterality Date     APPENDECTOMY       COLONOSCOPY       COMBINED ESOPHAGOSCOPY, GASTROSCOPY, DUODENOSCOPY (EGD), REMOVE ESOPHAGEAL STENT N/A 11/10/2021    Procedure: ESOPHAGOGASTRODUODENOSCOPY, WITH ESOPHAGEAL STENT REMOVAL;  Surgeon: Timothy Castro MD;  Location: [facility] OR     COMBINED ESOPHAGOSCOPY, GASTROSCOPY, DUODENOSCOPY (EGD), REMOVE ESOPHAGEAL STENT N/A 2022    Procedure: UPPER ENDOSCOPY WITH INTERPRETATION OF FLUOROSCOPY AND ESOPHAGEAL STENT PLACEMENT;  Surgeon: Timotyh Castro MD;  Location: [facility] OR     COMBINED ESOPHAGOSCOPY, GASTROSCOPY, DUODENOSCOPY (EGD), REMOVE ESOPHAGEAL STENT N/A 2022    Procedure: ESOPHAGOGASTRODUODENOSCOPY, WITH ESOPHAGEAL STENT REMOVAL;  Surgeon: Timothy Castro MD;  Location: [facility] OR     GI SURGERY       ORTHOPEDIC SURGERY      Total hips, rotator cuff repairs      No Known Allergies   Social History     Tobacco Use     Smoking status: Former Smoker     Smokeless tobacco: Never Used   Substance Use Topics     Alcohol use: Never      Wt Readings from Last 1 Encounters:   22 92 kg (202 lb 13.2 oz)        Anesthesia Evaluation   Pt has had prior anesthetic. Type: General.    History of anesthetic complications  - PONV.      ROS/MED HX  ENT/Pulmonary:     (+) sleep apnea, tobacco use, Past use, COPD,     Neurologic:     (+) CVA, date: ,     Cardiovascular:     (+) -----Taking blood thinners     METS/Exercise Tolerance: 3 - Able to walk 1-2 blocks without stopping Comment: Has supplimental oxygen that he uses as  necessary with exertion   Hematologic:       Musculoskeletal:   (+) arthritis,     GI/Hepatic: Comment: SBO      Renal/Genitourinary:       Endo:       Psychiatric/Substance Use:       Infectious Disease:       Malignancy:       Other:            Physical Exam    Airway             Respiratory Devices and Support         Dental  no notable dental history         Cardiovascular   cardiovascular exam normal          Pulmonary   pulmonary exam normal                OUTSIDE LABS:  CBC:   Lab Results   Component Value Date    WBC 7.4 09/19/2021    WBC 8.1 09/18/2021    HGB 13.7 09/19/2021    HGB 14.3 09/18/2021    HCT 41.9 09/19/2021    HCT 43.4 09/18/2021     09/19/2021     09/18/2021     BMP:   Lab Results   Component Value Date     09/19/2021     09/18/2021    POTASSIUM 4.0 09/21/2021    POTASSIUM 3.9 09/20/2021    CHLORIDE 105 09/19/2021    CHLORIDE 107 09/18/2021    CO2 25 09/19/2021    CO2 28 09/18/2021    BUN 11 09/19/2021    BUN 16 09/18/2021    CR 1.03 09/19/2021    CR 1.13 09/18/2021    GLC 96 08/19/2022    GLC 86 07/26/2022     COAGS: No results found for: PTT, INR, FIBR  POC: No results found for: BGM, HCG, HCGS  HEPATIC:   Lab Results   Component Value Date    ALBUMIN 3.0 (L) 09/19/2021    PROTTOTAL 5.8 (L) 09/19/2021    ALT 15 09/19/2021    AST 15 09/19/2021    ALKPHOS 76 09/19/2021    BILITOTAL 0.7 09/19/2021     OTHER:   Lab Results   Component Value Date    LACT 0.7 09/18/2021    KATHERINE 8.6 09/19/2021    PHOS 3.5 09/19/2021    MAG 1.8 09/21/2021    CRP 0.7 09/18/2021       Anesthesia Plan    ASA Status:  3      Anesthesia Type: General.     - Airway: ETT   Induction: Intravenous.   Maintenance: Balanced.        Consents    Anesthesia Plan(s) and associated risks, benefits, and realistic alternatives discussed. Questions answered and patient/representative(s) expressed understanding.    - Discussed:     - Discussed with:  Patient         Postoperative Care    Pain management: IV  analgesics.   PONV prophylaxis: Ondansetron (or other 5HT-3), Dexamethasone or Solumedrol     Comments:                Jagruti Hammond MD

## 2022-08-19 NOTE — ANESTHESIA PROCEDURE NOTES
Airway       Patient location during procedure: OR       Procedure Start/Stop Times: 8/19/2022 10:34 AM  Staff -        CRNA: Marjorie Friend APRN CRNA       Performed By: CRNA  Consent for Airway        Urgency: elective  Indications and Patient Condition       Indications for airway management: pierre-procedural       Induction type:intravenous       Mask difficulty assessment: 1 - vent by mask    Final Airway Details       Final airway type: endotracheal airway       Successful airway: ETT - single and Oral  Endotracheal Airway Details        ETT size (mm): 7.5       Cuffed: yes       Successful intubation technique: direct laryngoscopy       DL Blade Type: MAC 3       Grade View of Cords: 1       Adjucts: stylet       Position: Right       Measured from: lips       Secured at (cm): 23       Bite block used: None    Post intubation assessment        Placement verified by: capnometry, equal breath sounds and chest rise        Number of attempts at approach: 1       Secured with: pink tape       Ease of procedure: easy       Dentition: Intact and Unchanged    Medication(s) Administered   Medication Administration Time: 8/19/2022 10:34 AM

## 2022-08-19 NOTE — DISCHARGE SUMMARY
"[facility]   MIS Discharge Summary    Date of Admission: 8/19/2022  Date of Discharge: 8/19/2022    Admission Diagnosis:  1. Gastric anastomotic stricture    Discharge Diagnosis:  1. Same as above; s/p strictureplasty and g-tube placement    Consultations:  None    Procedures:  1. Endoscopic-assisted transgastric strictureplasty with gastrostomy tube placement, interpretation of fluroscopy by Dr Castro    Brief HPI:  Dai Izquierdo is 75yo M with a history of gastric anastomotic stricture that has failed stent placement in the placement multiple times. He arrived today for stapled strictureplasty and placement of G tube as the next step in his treatment.     Hospital Course:  The patient was admitted and underwent the above procedure. The patient tolerated the procedure well. There were no complications. Pain was controlled with oral pain medication and the patient was able to ambulate without difficulty prior to leaving. The patient received appropriate education post operatively. On POD #0 the patient was discharged to home from the PACU.    Discharge Physical Exam:  BP (!) 142/74   Pulse 60   Temp 97.9  F (36.6  C) (Oral)   Resp 18   Ht 1.702 m (5' 7\")   Wt 92 kg (202 lb 13.2 oz)   SpO2 96%   BMI 31.77 kg/m      Gen: NAD  Lungs: non-labored breathing  CV: regular rhythm, normal rate   Abd: soft, nondistended, appropriately tender, g-tube in place with bag  Ext: no peripheral edema  Neuro: AOx3    Meds:       Review of your medicines      START taking      Dose / Directions   oxyCODONE 5 MG tablet  Commonly known as: ROXICODONE  Used for: SBO (small bowel obstruction) (H)      Dose: 5 mg  Take 1 tablet (5 mg) by mouth every 6 hours as needed for pain  Quantity: 20 tablet  Refills: 0        CONTINUE these medicines which have NOT CHANGED      Dose / Directions   acetaminophen 500 MG tablet  Commonly known as: TYLENOL      Dose: 500 mg  Take 500 mg by mouth every 6 hours as " needed  Refills: 0     acetaminophen-codeine 300-30 MG tablet  Commonly known as: TYLENOL #3      Dose: 1 tablet  Take 1 tablet by mouth daily as needed for pain In right shoulder  Refills: 0     albuterol 108 (90 Base) MCG/ACT inhaler  Commonly known as: PROAIR HFA/PROVENTIL HFA/VENTOLIN HFA      Dose: 1-2 puff  Inhale 1-2 puffs into the lungs every 6 hours as needed  Refills: 0     amLODIPine 5 MG tablet  Commonly known as: NORVASC      Dose: 1 tablet  Take 1 tablet by mouth every evening  Refills: 0     amLODIPine-benazepril 5-10 MG capsule  Commonly known as: LOTREL      daily  Refills: 0     atorvastatin 10 MG tablet  Commonly known as: LIPITOR      Dose: 1 tablet  Take 1 tablet by mouth every evening  Refills: 0     cholecalciferol 125 mcg (5000 units) capsule  Commonly known as: VITAMIN D3  Indication: Vitamin D Deficiency      Dose: 125 mcg  Take 125 mcg by mouth every evening  Refills: 0     clopidogrel 75 MG tablet  Commonly known as: PLAVIX  Indication: stroke caused by blood clot.      Dose: 75 mg  Take 1 tablet (75 mg) by mouth daily Restart 11/12/2021  Refills: 0     cyanocobalamin 1000 MCG tablet  Commonly known as: VITAMIN B-12      Dose: 1 tablet  Take 1 tablet by mouth every evening  Refills: 0     Incruse Ellipta 62.5 MCG/INH inhaler  Generic drug: umeclidinium      Dose: 1 puff  Inhale 1 puff into the lungs daily  Refills: 0     iron 325 (65 Fe) MG tablet      Dose: 1 tablet  Take 1 tablet by mouth every evening  Refills: 0     multivitamin w/minerals tablet      Dose: 1 tablet  Take 1 tablet by mouth every evening  Refills: 0     ondansetron 4 MG ODT tab  Commonly known as: Zofran ODT  Used for: Dysphagia      Dose: 4 mg  Take 1 tablet (4 mg) by mouth every 8 hours as needed for nausea  Quantity: 40 tablet  Refills: 0     pantoprazole 40 MG EC tablet  Commonly known as: PROTONIX  Used for: Gastritis without bleeding, unspecified chronicity, unspecified gastritis type      Dose: 40 mg  Take 1  tablet (40 mg) by mouth daily  Quantity: 30 tablet  Refills: 0     potassium 99 MG Tabs      Dose: 1 tablet  Take 1 tablet by mouth every evening  Refills: 0     terazosin 5 MG capsule  Commonly known as: HYTRIN      Dose: 1 capsule  Take 1 capsule by mouth At Bedtime  Refills: 0           Where to get your medicines      Some of these will need a paper prescription and others can be bought over the counter. Ask your nurse if you have questions.    Bring a paper prescription for each of these medications    oxyCODONE 5 MG tablet         Follow Up:  -Follow up with provider in clinic in 5 week(s) after discharge for timing of G-tube removal.       GENERAL SURGERY PATIENTS: Call [phone] for scheduling needs or to reach your care team

## 2022-08-19 NOTE — DISCHARGE INSTRUCTIONS
Follow up with Dr. Castro in 5 weeks for timing of G-tube removal     [facility] [place]  Same-Day Surgery   Adult Discharge Orders & Instructions     For 24 hours after surgery    Get plenty of rest.  A responsible adult must stay with you for at least 24 hours after you leave the hospital.   Do not drive or use heavy equipment.  If you have weakness or tingling, don't drive or use heavy equipment until this feeling goes away.  Do not drink alcohol.  Avoid strenuous or risky activities.  Ask for help when climbing stairs.   You may feel lightheaded.  IF so, sit for a few minutes before standing.  Have someone help you get up.   If you have nausea (feel sick to your stomach): Drink only clear liquids such as apple juice, ginger ale, broth or 7-Up.  Rest may also help.  Be sure to drink enough fluids.  Move to a regular diet as you feel able.  You may have a slight fever. Call the doctor if your fever is over 100 F (37.7 C) (taken under the tongue) or lasts longer than 24 hours.  You may have a dry mouth, a sore throat, muscle aches or trouble sleeping.  These should go away after 24 hours.  Do not make important or legal decisions.   Call your doctor for any of the followin.  Signs of infection (fever, growing tenderness at the surgery site, a large amount of drainage or bleeding, severe pain, foul-smelling drainage, redness, swelling).    2. It has been over 8 to 10 hours since surgery and you are still not able to urinate (pass water).    3.  Headache for over 24 hours.    4.  Numbness, tingling or weakness the day after surgery (if you had spinal anesthesia).  To contact a doctor, Dr Castro's clinic at [phone]  call . or:    '   [phone] and ask for the resident on call for Bariatric   (answered 24 hours a day)  '   Emergency Department:    [facility]: [phone]       (TTY for hearing impaired: [phone])    [facility]: [phone]       (TTY for  hearing impaired: [phone])

## 2022-08-20 ENCOUNTER — TELEPHONE (OUTPATIENT)
Dept: SURGERY | Facility: CLINIC | Age: 77
End: 2022-08-20

## 2022-08-20 NOTE — TELEPHONE ENCOUNTER
Telephone Call    Received patient phone call in regards to post operative care with his G tube. He was supposed to remove the bag today however had some questions. He slept on his back and then this morning had minimal output, but later laid back down onto his side and the bag was 2/3rds full which concerned him. The contents resemble the liquids that he has been intaking. Reports his pain is rated 2/10 and being managed primary with tylenol. No fevers.     Re-emphasized instructions from Dr. Cobb note from yesterday. Patient should keep it capped with oral intake. Okay to uncap periodically or if having symptoms of nausea. Drainage can be positional given anterior location of feeding tube on stomach wall. Patient reports understanding these instructions.   - - - - - - - - - - - - - - - - - -  Yolie Mckeon MD  [facility] General Surgery PGY-2  08/20/2022

## 2022-08-30 NOTE — OP NOTE
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 Otherwise normal intraluminal anatomy. 2 gold load staplers used to open stricture and then transgastric g-tube placed with fluoroscopy. .  Complications:            None.  Implants:         * No implants in log *   -G-tube placed and sutured down.             Operative Indications:    Mr. Izquierdo had prior VBG surgery; has had persistent stricture at site of VBG ring.    I reviewed risks/benefits with Mr. Izquierdo about doing stapled gastroplasty via transgastric approach.    G-tube for 4-6 weeks post-procedure would be required.    He agreed to stated procedure.    Operative details:    General anesthesia induced.  Abdomen prepped sterilely and draped.    Procedure started by passing 5mm endoscope into lower stomach.    Using direct visualization of abdominal wall and fluoroscopy to ensure that colon was out of the way, I passed 18gauge introducer needle into the stomach at the antrum.  I then passed wire and 12Fr peelaway sheath system over this.      I performed gastropexy using O vicryl in interrupted fashion at left and right of gastrotomy.    Next, I passed step trocar sheath into stomach under direct optical view.    Pulling back on gastropexy sutures, I passed 12mm step port into antrum under direct view.    Next, I connected CO2  tubing to the port and insufflated the stomach to 15mm pressure using CO2.    I passed the 35mm gold load cartridge on OnForce powered stapler into the stomach and passed this through the VBG outlet under direct view and fired the stapler to slightly open the VBG stircture.  I used a 2nd load of the gold cartridge and this provided a very nice opening of the VBG outlet with upper pouch and lower stomach completely connected.    Hemostasis was excellent.    I then placed wire and sheath through the 12mm port and removed the port and passed a 20Fr g-tube into the stomach and inflated the balloon to 10mm using water.    I pulled the balloon back to 3.0cm and pushed the button down and placed two sutures of O vicryl through the button grooves to increase friction between button and shaft.    I placed the g-tube to gravity via a bag.    Tolerated the procedure well.    All instrument/sponge counts correct.  No needles used.    I was present for all portions of the procedure.      Timothy Castro MD  Surgery  [phone] (hospital )  [phone] (clinic nurses)

## 2022-09-06 ENCOUNTER — TELEPHONE (OUTPATIENT)
Dept: SURGERY | Facility: CLINIC | Age: 77
End: 2022-09-06

## 2022-09-06 NOTE — TELEPHONE ENCOUNTER
Patient had a G-tube placed and is having it removed 9/22/22.  Patient states he is having a little pain around the site when he is moving.  Reassured the patient that the minor pain is normal.  Instructed patient on how to tape the G-tube to minimize movement.

## 2022-09-06 NOTE — TELEPHONE ENCOUNTER
Reason for call:  Other   Patient called regarding (reason for call): call back  Additional comments: Pt is having some pain, it's been going on for awhile and he would like a call back      Phone number to reach patient:  Cell number on file:    Telephone Information:   Mobile [phone]       Best Time:      Can we leave a detailed message on this number?  YES    Travel screening: Not Applicable

## 2022-09-19 ENCOUNTER — TELEPHONE (OUTPATIENT)
Dept: SURGERY | Facility: CLINIC | Age: 77
End: 2022-09-19

## 2022-09-20 ENCOUNTER — TELEPHONE (OUTPATIENT)
Dept: SURGERY | Facility: CLINIC | Age: 77
End: 2022-09-20

## 2022-09-20 NOTE — TELEPHONE ENCOUNTER
Phone call to patient.    Notified that there is not prep for him to have his G-Tube removed and he does not need someone to drive him.

## 2022-09-20 NOTE — TELEPHONE ENCOUNTER
Reason for call:  Other   Patient called regarding (reason for call): call back  Additional comments: pt is having a procedure 9/22 and would like a call beforehand to go over what he is supposed to do to prepare. He does not want a My[facility] msg    Phone number to reach patient:  Home number on file [phone] (home)    Best Time:      Can we leave a detailed message on this number?  YES    Travel screening: Not Applicable

## 2022-09-22 ENCOUNTER — OFFICE VISIT (OUTPATIENT)
Dept: ENDOCRINOLOGY | Facility: CLINIC | Age: 77
End: 2022-09-22
Payer: MEDICARE

## 2022-09-22 VITALS
BODY MASS INDEX: 34.55 KG/M2 | HEART RATE: 83 BPM | SYSTOLIC BLOOD PRESSURE: 136 MMHG | HEIGHT: 66 IN | DIASTOLIC BLOOD PRESSURE: 89 MMHG | OXYGEN SATURATION: 94 % | WEIGHT: 215 LBS

## 2022-09-22 DIAGNOSIS — R13.19 ESOPHAGEAL DYSPHAGIA: Primary | ICD-10-CM

## 2022-09-22 PROCEDURE — 99213 OFFICE O/P EST LOW 20 MIN: CPT | Performed by: SURGERY

## 2022-09-22 ASSESSMENT — PAIN SCALES - GENERAL: PAINLEVEL: NO PAIN (0)

## 2022-09-22 NOTE — NURSING NOTE
"(   Chief Complaint   Patient presents with     RECHECK     Remove G-tube    )    ( Weight: 97.5 kg (215 lb) )  ( Height: 167.6 cm (5' 6\") )  ( BMI (Calculated): 34.7 )  (   )  (   )  (   )  (   )  (   )  (   )    ( BP: 136/89 )  (   )  (   )  (   )  ( Pulse: 83 )  (   )  ( SpO2: 94 % )    (   Patient Active Problem List   Diagnosis     SBO (small bowel obstruction) (H)    )  (   Current Outpatient Medications   Medication Sig Dispense Refill     acetaminophen (TYLENOL) 500 MG tablet Take 500 mg by mouth every 6 hours as needed       acetaminophen-codeine (TYLENOL #3) 300-30 MG tablet Take 1 tablet by mouth daily as needed for pain In right shoulder       albuterol (PROAIR HFA/PROVENTIL HFA/VENTOLIN HFA) 108 (90 Base) MCG/ACT inhaler Inhale 1-2 puffs into the lungs every 6 hours as needed       amLODIPine (NORVASC) 5 MG tablet Take 1 tablet by mouth every evening        amLODIPine-benazepril (LOTREL) 5-10 MG capsule daily       atorvastatin (LIPITOR) 10 MG tablet Take 1 tablet by mouth every evening        cholecalciferol (VITAMIN D3) 125 mcg (5000 units) capsule Take 125 mcg by mouth every evening        clopidogrel (PLAVIX) 75 MG tablet Take 1 tablet (75 mg) by mouth daily Restart 11/12/2021       cyanocobalamin (VITAMIN B-12) 1000 MCG tablet Take 1 tablet by mouth every evening        Ferrous Sulfate (IRON) 325 (65 Fe) MG tablet Take 1 tablet by mouth every evening       INCRUSE ELLIPTA 62.5 MCG/INH Inhaler Inhale 1 puff into the lungs daily       multivitamin w/minerals (THERA-VIT-M) tablet Take 1 tablet by mouth every evening       ondansetron (ZOFRAN ODT) 4 MG ODT tab Take 1 tablet (4 mg) by mouth every 8 hours as needed for nausea 40 tablet 0     pantoprazole (PROTONIX) 40 MG EC tablet Take 1 tablet (40 mg) by mouth daily 30 tablet 0     potassium 99 MG TABS Take 1 tablet by mouth every evening        terazosin (HYTRIN) 5 MG capsule Take 1 capsule by mouth At Bedtime      )  ( Diabetes Eval:    )    ( Pain " Eval:  No Pain (0) )    ( Wound Eval:       )    (   History   Smoking Status     Former Smoker   Smokeless Tobacco     Never Used    )    ( Signed By:  Nikolas Yoo, EMT; September 22, 2022; 11:19 AM )

## 2022-09-22 NOTE — LETTER
2022       RE: Dai Izquierdo  [id number] [facility] [id number]     Dear Colleague,    Thank you for referring your patient, Dai Izquierdo, to the [facility] [place] at [facility]. Please see a copy of my visit note below.        Return Bariatric Surgery Note    RE: Dai Izquierdo  MR#: [id number]  : 1945  VISIT DATE: Sep 22, 2022    Dear Tyson,    I had the pleasure of seeing your patient, Dai Izquierdo, in my post-bariatric surgery assessment clinic.    Dai has had a longstanding dysphagia/forced maladaptive eating problem related to gastric stricture.    This appears to finally be completely treated via endoscopic assisted strictureplasty using transgastric stapling of the VBG outlet.    He required g-tube for 5 weeks and is here in clinic for g-tube removal.      Dai feels great; no more problems with food blockages; he is overall in good shape now.    No abdominal pain aside from g-tube.    Note: I removed g-tube in clinic by removing water from balloon and the tube pulled out easily.    Dressing put over wound.  This should close up within a few weeks.    Followup with me if troubles with wound.         Weight: 97.5 kg (215 lb)     Social History:  No flowsheet data found.    Medications:  Current Outpatient Medications   Medication     acetaminophen (TYLENOL) 500 MG tablet     acetaminophen-codeine (TYLENOL #3) 300-30 MG tablet     albuterol (PROAIR HFA/PROVENTIL HFA/VENTOLIN HFA) 108 (90 Base) MCG/ACT inhaler     amLODIPine (NORVASC) 5 MG tablet     amLODIPine-benazepril (LOTREL) 5-10 MG capsule     atorvastatin (LIPITOR) 10 MG tablet     cholecalciferol (VITAMIN D3) 125 mcg (5000 units) capsule     clopidogrel (PLAVIX) 75 MG tablet     cyanocobalamin (VITAMIN B-12) 1000 MCG tablet     Ferrous Sulfate (IRON) 325 (65 Fe) MG tablet     INCRUSE ELLIPTA 62.5 MCG/INH Inhaler     multivitamin w/minerals  "(THERA-VIT-M) tablet     ondansetron (ZOFRAN ODT) 4 MG ODT tab     pantoprazole (PROTONIX) 40 MG EC tablet     potassium 99 MG TABS     terazosin (HYTRIN) 5 MG capsule     No current facility-administered medications for this visit.     No flowsheet data found.    ROS:  GI: No flowsheet data found.  Skin: No flowsheet data found.  Psych: No flowsheet data found.  Female Only: No flowsheet data found.    LABS/IMAGING/MEDICAL RECORDS REVIEW:   n/a    PHYSICAL EXAMINATION:  /89 (BP Location: Left arm, Patient Position: Sitting, Cuff Size: Adult Regular)   Pulse 83   Ht 1.676 m (5' 6\")   Wt 97.5 kg (215 lb)   SpO2 94%   BMI 34.70 kg/m     NAD  g-tube site is clean and intact.        ASSESSMENT AND PLAN:      1. 5 weeks s/p strictureplasty, successful.      I reviewed importance of eating good nourishing foods and he should see RD and ASHLEY to ensure that he has tools available to keep weight off.    2. Morbid Obesity historical current BMI: Body mass index is 34.7 kg/m .  3. Post surgical malabsorption:   Labs ordered per protocol.   Follow food plan per dietitian recommendations.   Continue taking recommended post-op vitamins.  4. Return to clinic in 6 months after returning from wintering location ([place]).    Sincerely,    Timothy Castro MD    I spent a total of 20 minutes face to face with Dai during today's office visit. Over 50% of this time was spent counseling the patient and/or coordinating care.    "

## 2022-09-23 NOTE — PROGRESS NOTES
Return Bariatric Surgery Note    RE: Dai Izquierdo  MR#: [id number]  : 1945  VISIT DATE: Sep 22, 2022    Dear Tyson,    I had the pleasure of seeing your patient, Dai Izquierdo, in my post-bariatric surgery assessment clinic.    Dai has had a longstanding dysphagia/forced maladaptive eating problem related to gastric stricture.    This appears to finally be completely treated via endoscopic assisted strictureplasty using transgastric stapling of the VBG outlet.    He required g-tube for 5 weeks and is here in clinic for g-tube removal.      Dai feels great; no more problems with food blockages; he is overall in good shape now.    No abdominal pain aside from g-tube.    Note: I removed g-tube in clinic by removing water from balloon and the tube pulled out easily.    Dressing put over wound.  This should close up within a few weeks.    Followup with me if troubles with wound.         Weight: 97.5 kg (215 lb)     Social History:  No flowsheet data found.    Medications:  Current Outpatient Medications   Medication     acetaminophen (TYLENOL) 500 MG tablet     acetaminophen-codeine (TYLENOL #3) 300-30 MG tablet     albuterol (PROAIR HFA/PROVENTIL HFA/VENTOLIN HFA) 108 (90 Base) MCG/ACT inhaler     amLODIPine (NORVASC) 5 MG tablet     amLODIPine-benazepril (LOTREL) 5-10 MG capsule     atorvastatin (LIPITOR) 10 MG tablet     cholecalciferol (VITAMIN D3) 125 mcg (5000 units) capsule     clopidogrel (PLAVIX) 75 MG tablet     cyanocobalamin (VITAMIN B-12) 1000 MCG tablet     Ferrous Sulfate (IRON) 325 (65 Fe) MG tablet     INCRUSE ELLIPTA 62.5 MCG/INH Inhaler     multivitamin w/minerals (THERA-VIT-M) tablet     ondansetron (ZOFRAN ODT) 4 MG ODT tab     pantoprazole (PROTONIX) 40 MG EC tablet     potassium 99 MG TABS     terazosin (HYTRIN) 5 MG capsule     No current facility-administered medications for this visit.     No flowsheet data found.    ROS:  GI: No flowsheet data found.  Skin: No flowsheet data  "found.  Psych: No flowsheet data found.  Female Only: No flowsheet data found.    LABS/IMAGING/MEDICAL RECORDS REVIEW:   n/a    PHYSICAL EXAMINATION:  /89 (BP Location: Left arm, Patient Position: Sitting, Cuff Size: Adult Regular)   Pulse 83   Ht 1.676 m (5' 6\")   Wt 97.5 kg (215 lb)   SpO2 94%   BMI 34.70 kg/m     NAD  g-tube site is clean and intact.        ASSESSMENT AND PLAN:      1. 5 weeks s/p strictureplasty, successful.      I reviewed importance of eating good nourishing foods and he should see RD and ASHLEY to ensure that he has tools available to keep weight off.    2. Morbid Obesity historical current BMI: Body mass index is 34.7 kg/m .  3. Post surgical malabsorption:   Labs ordered per protocol.   Follow food plan per dietitian recommendations.   Continue taking recommended post-op vitamins.  4. Return to clinic in 6 months after returning from wintering location ([place]).    Sincerely,    Timothy Castro MD    I spent a total of 20 minutes face to face with Dai during today's office visit. Over 50% of this time was spent counseling the patient and/or coordinating care.  "

## 2022-10-10 ENCOUNTER — HEALTH MAINTENANCE LETTER (OUTPATIENT)
Age: 77
End: 2022-10-10

## 2022-10-25 ENCOUNTER — TELEPHONE (OUTPATIENT)
Dept: ENDOCRINOLOGY | Facility: CLINIC | Age: 77
End: 2022-10-25

## 2022-10-25 NOTE — TELEPHONE ENCOUNTER
Patient has been gaining weight back, he was advised to call if that happened. He is leaving on Saturday for [place] for the winter.     [phone] okay to leave VM

## 2022-10-25 NOTE — TELEPHONE ENCOUNTER
Patient is not having any issues with swallowing.  States he has gained 25 pounds.    Breakfast:  2-3 scrambled eggs, toast jelly, coffee or cream of wheat and coffee    Lunch:  Salad with onions, croutons and salad dressing    Dinner: Meat, veggie    Snacks: not usually    Encouraged patient to increase protein intake to at least 60 grams per day.    Fluid intake is over 64 ounces per day.    Patient experiencing an increase in respiratory mucus production.  Patient had COVID in 2020.  Last 3-4 weeks he has noticed it worsening.  Encouraged patient to follow up with PCP.  Suggested OTC allergy medicine to see if the symptoms are related to allergies.     Patient is going back to [place] this weekend.

## 2022-12-14 ENCOUNTER — TELEPHONE (OUTPATIENT)
Dept: SURGERY | Facility: CLINIC | Age: 77
End: 2022-12-14

## 2022-12-15 NOTE — TELEPHONE ENCOUNTER
"Patient went to his PCP in [place].  PCP is advising that he stop it since he has been it on for so long.  States he has reflux symptoms before his \"ring\" was removed.  Patient is asymptomatic.    Discussed request with Dr. Castro.  Patient is okay to trial being off Pantoprazole.  Patient notified.  "

## 2023-02-18 ENCOUNTER — HEALTH MAINTENANCE LETTER (OUTPATIENT)
Age: 78
End: 2023-02-18

## 2024-03-11 NOTE — ANESTHESIA CARE TRANSFER NOTE
Patient: Dai Izquierdo    Procedure: Procedure(s):  Endoscopic-assisted transgastric strictureplasty with gastrostomy tube placement, intepretation of fluroscopy       Diagnosis: Gastric anastomotic stricture [K92.9, K31.89]  Diagnosis Additional Information: No value filed.    Anesthesia Type:   No value filed.     Note:    Oropharynx: oropharynx clear of all foreign objects and spontaneously breathing  Level of Consciousness: drowsy  Oxygen Supplementation: face mask  Level of Supplemental Oxygen (L/min / FiO2): 6  Independent Airway: airway patency satisfactory and stable  Dentition: dentition unchanged  Vital Signs Stable: post-procedure vital signs reviewed and stable  Report to RN Given: handoff report given  Patient transferred to: PACU    Handoff Report: Identifed the Patient, Identified the Reponsible Provider, Reviewed the pertinent medical history, Discussed the surgical course, Reviewed Intra-OP anesthesia mangement and issues during anesthesia, Set expectations for post-procedure period and Allowed opportunity for questions and acknowledgement of understanding      Vitals:  Vitals Value Taken Time   BP     Temp     Pulse     Resp     SpO2 99 % 08/19/22 1231   Vitals shown include unvalidated device data.    Electronically Signed By: OPAL Roque CRNA  August 19, 2022  12:32 PM   no

## 2024-03-16 ENCOUNTER — HEALTH MAINTENANCE LETTER (OUTPATIENT)
Age: 79
End: 2024-03-16

## 2025-03-22 ENCOUNTER — HEALTH MAINTENANCE LETTER (OUTPATIENT)
Age: 80
End: 2025-03-22

## (undated) DEVICE — JELLY LUBRICATING SURGILUBE 2OZ TUBE

## (undated) DEVICE — ENDO CAP AND TUBING STERILE FOR ENDOGATOR  100130

## (undated) DEVICE — PITCHER STERILE 1000ML  SSK9004A

## (undated) DEVICE — SYR 10ML LL W/O NDL 302995

## (undated) DEVICE — DRAPE C-ARM W/STRAPS 42X72" 07-CA104

## (undated) DEVICE — DRAPE LAP W/ARMBOARD 29410

## (undated) DEVICE — SYR 30ML SLIP TIP W/O NDL 302833

## (undated) DEVICE — LINEN GOWN XLG 5407

## (undated) DEVICE — SPONGE RAY-TEC 4X8" 7318

## (undated) DEVICE — ENDO SYSTEM WATER BOTTLE & TUBING W/CO2 FILTER 00711549

## (undated) DEVICE — KIT ENDO FIRST STEP DISINFECTANT 200ML W/POUCH EP-4

## (undated) DEVICE — ENDO BITE BLOCK ADULT OMNI-BLOC

## (undated) DEVICE — SOL WATER IRRIG 1000ML BOTTLE 2F7114

## (undated) DEVICE — DEVICE SUTURE PASSER 14GA WECK EFX EFXSP2

## (undated) DEVICE — LIGHT HANDLE X1 31140133

## (undated) DEVICE — CUP AND LID 2PK 2OZ STERILE  SSK9006A

## (undated) DEVICE — GUIDEWIRE AMPLATZ SUPER STIFF 0.035"X260CM M001465260

## (undated) DEVICE — DRAPE SHEET MED 44X70" 9355

## (undated) DEVICE — TUBING SUCTION 10'X3/16" N510

## (undated) DEVICE — LABEL MEDICATION SYSTEM 3303-P

## (undated) DEVICE — SUCTION MANIFOLD NEPTUNE 2 SYS 4 PORT 0702-020-000

## (undated) DEVICE — STPL ENDO HANDLE GIA ULTRA UNIVERSAL STD EGIAUSTND

## (undated) DEVICE — SYR 10ML SLIP TIP W/O NDL 303134

## (undated) DEVICE — LINEN TOWEL PACK X5 5464

## (undated) DEVICE — PACK ENDOSCOPY GI CUSTOM UMMC

## (undated) DEVICE — CATH VA INTR 12FRX14CM KIT LATEX

## (undated) DEVICE — SOL NACL 0.9% IRRIG 1000ML BOTTLE 2F7124

## (undated) DEVICE — ENDO TROCAR 15MM VERSASTEP VS101015P

## (undated) DEVICE — PACK SET-UP STD 9102

## (undated) DEVICE — GUIDEWIRE BENSON STR EXCHANGE .035X260CM TSF-35-260-BH

## (undated) DEVICE — BASIN SET SINGLE STERILE 13752-624

## (undated) DEVICE — Device

## (undated) DEVICE — ENDO FORCEP ENDOJAW BIOPSY 2.8MMX160CM FB-220K

## (undated) DEVICE — WIRE GUIDE AMPLATZ SUPER STIFF 0.035"X260CM M001465261

## (undated) DEVICE — TUBING SMOKE EVAC PNEUMOCLEAR HIGH FLOW 0620050250

## (undated) DEVICE — ENDO TROCAR 12MM VERSASTEP VS101012P

## (undated) DEVICE — KIT CONNECTOR FOR OLYMPUS ENDOSCOPES DEFENDO 100310

## (undated) DEVICE — GLOVE PROTEXIS POWDER FREE SMT 7.5  2D72PT75X

## (undated) DEVICE — SU VICRYL 0 TIE 54" J608H

## (undated) DEVICE — DRAPE MAYO STAND 23X54 8337

## (undated) DEVICE — RAD KNIFE HANDLE W/11 BLADE DISPOSABLE 371611

## (undated) DEVICE — TUBE GASTROSTOMY MIC ENFIT 20FR 8100-20

## (undated) DEVICE — NDL COUNTER 20CT 31142493

## (undated) RX ORDER — ONDANSETRON 2 MG/ML
INJECTION INTRAMUSCULAR; INTRAVENOUS
Status: DISPENSED
Start: 2022-07-26

## (undated) RX ORDER — GLYCOPYRROLATE 0.2 MG/ML
INJECTION, SOLUTION INTRAMUSCULAR; INTRAVENOUS
Status: DISPENSED
Start: 2022-07-26

## (undated) RX ORDER — PROPOFOL 10 MG/ML
INJECTION, EMULSION INTRAVENOUS
Status: DISPENSED
Start: 2022-07-26

## (undated) RX ORDER — PROPOFOL 10 MG/ML
INJECTION, EMULSION INTRAVENOUS
Status: DISPENSED
Start: 2022-08-19

## (undated) RX ORDER — LIDOCAINE HYDROCHLORIDE 20 MG/ML
INJECTION, SOLUTION EPIDURAL; INFILTRATION; INTRACAUDAL; PERINEURAL
Status: DISPENSED
Start: 2021-11-10

## (undated) RX ORDER — ONDANSETRON 2 MG/ML
INJECTION INTRAMUSCULAR; INTRAVENOUS
Status: DISPENSED
Start: 2021-11-10

## (undated) RX ORDER — FENTANYL CITRATE 50 UG/ML
INJECTION, SOLUTION INTRAMUSCULAR; INTRAVENOUS
Status: DISPENSED
Start: 2021-11-10

## (undated) RX ORDER — BUPIVACAINE HYDROCHLORIDE 2.5 MG/ML
INJECTION, SOLUTION EPIDURAL; INFILTRATION; INTRACAUDAL
Status: DISPENSED
Start: 2022-08-19

## (undated) RX ORDER — DEXAMETHASONE SODIUM PHOSPHATE 4 MG/ML
INJECTION, SOLUTION INTRA-ARTICULAR; INTRALESIONAL; INTRAMUSCULAR; INTRAVENOUS; SOFT TISSUE
Status: DISPENSED
Start: 2021-11-10

## (undated) RX ORDER — ROCURONIUM BROMIDE 50 MG/5 ML
SYRINGE (ML) INTRAVENOUS
Status: DISPENSED
Start: 2021-11-10

## (undated) RX ORDER — CEFAZOLIN SODIUM 1 G/3ML
INJECTION, POWDER, FOR SOLUTION INTRAMUSCULAR; INTRAVENOUS
Status: DISPENSED
Start: 2021-11-10

## (undated) RX ORDER — FENTANYL CITRATE 50 UG/ML
INJECTION, SOLUTION INTRAMUSCULAR; INTRAVENOUS
Status: DISPENSED
Start: 2021-10-15

## (undated) RX ORDER — EPHEDRINE SULFATE 50 MG/ML
INJECTION, SOLUTION INTRAMUSCULAR; INTRAVENOUS; SUBCUTANEOUS
Status: DISPENSED
Start: 2021-10-15

## (undated) RX ORDER — DEXTROSE MONOHYDRATE 25 G/50ML
INJECTION, SOLUTION INTRAVENOUS
Status: DISPENSED
Start: 2021-11-10

## (undated) RX ORDER — FENTANYL CITRATE 50 UG/ML
INJECTION, SOLUTION INTRAMUSCULAR; INTRAVENOUS
Status: DISPENSED
Start: 2022-06-28

## (undated) RX ORDER — ESMOLOL HYDROCHLORIDE 10 MG/ML
INJECTION INTRAVENOUS
Status: DISPENSED
Start: 2021-10-15

## (undated) RX ORDER — FENTANYL CITRATE 50 UG/ML
INJECTION, SOLUTION INTRAMUSCULAR; INTRAVENOUS
Status: DISPENSED
Start: 2022-08-19

## (undated) RX ORDER — FENTANYL CITRATE 50 UG/ML
INJECTION, SOLUTION INTRAMUSCULAR; INTRAVENOUS
Status: DISPENSED
Start: 2022-07-26

## (undated) RX ORDER — GLYCOPYRROLATE 0.2 MG/ML
INJECTION, SOLUTION INTRAMUSCULAR; INTRAVENOUS
Status: DISPENSED
Start: 2022-08-19

## (undated) RX ORDER — ONDANSETRON 2 MG/ML
INJECTION INTRAMUSCULAR; INTRAVENOUS
Status: DISPENSED
Start: 2021-10-15

## (undated) RX ORDER — CEFAZOLIN SODIUM/WATER 2 G/20 ML
SYRINGE (ML) INTRAVENOUS
Status: DISPENSED
Start: 2022-08-19

## (undated) RX ORDER — LIDOCAINE HYDROCHLORIDE 20 MG/ML
INJECTION, SOLUTION EPIDURAL; INFILTRATION; INTRACAUDAL; PERINEURAL
Status: DISPENSED
Start: 2022-07-26

## (undated) RX ORDER — FENTANYL CITRATE-0.9 % NACL/PF 10 MCG/ML
PLASTIC BAG, INJECTION (ML) INTRAVENOUS
Status: DISPENSED
Start: 2022-07-26

## (undated) RX ORDER — PROPOFOL 10 MG/ML
INJECTION, EMULSION INTRAVENOUS
Status: DISPENSED
Start: 2021-11-10

## (undated) RX ORDER — LIDOCAINE HYDROCHLORIDE 20 MG/ML
INJECTION, SOLUTION EPIDURAL; INFILTRATION; INTRACAUDAL; PERINEURAL
Status: DISPENSED
Start: 2022-08-19

## (undated) RX ORDER — DEXAMETHASONE SODIUM PHOSPHATE 4 MG/ML
INJECTION, SOLUTION INTRA-ARTICULAR; INTRALESIONAL; INTRAMUSCULAR; INTRAVENOUS; SOFT TISSUE
Status: DISPENSED
Start: 2022-08-19

## (undated) RX ORDER — IOPAMIDOL 408 MG/ML
INJECTION, SOLUTION INTRATHECAL
Status: DISPENSED
Start: 2022-08-19

## (undated) RX ORDER — ESMOLOL HYDROCHLORIDE 10 MG/ML
INJECTION INTRAVENOUS
Status: DISPENSED
Start: 2021-11-10

## (undated) RX ORDER — EPHEDRINE SULFATE 50 MG/ML
INJECTION, SOLUTION INTRAMUSCULAR; INTRAVENOUS; SUBCUTANEOUS
Status: DISPENSED
Start: 2021-11-10

## (undated) RX ORDER — FENTANYL CITRATE-0.9 % NACL/PF 10 MCG/ML
PLASTIC BAG, INJECTION (ML) INTRAVENOUS
Status: DISPENSED
Start: 2021-10-15

## (undated) RX ORDER — FENTANYL CITRATE-0.9 % NACL/PF 10 MCG/ML
PLASTIC BAG, INJECTION (ML) INTRAVENOUS
Status: DISPENSED
Start: 2022-08-19

## (undated) RX ORDER — OXYCODONE HYDROCHLORIDE 5 MG/1
TABLET ORAL
Status: DISPENSED
Start: 2022-08-19

## (undated) RX ORDER — PROPOFOL 10 MG/ML
INJECTION, EMULSION INTRAVENOUS
Status: DISPENSED
Start: 2021-10-15

## (undated) RX ORDER — FENTANYL CITRATE-0.9 % NACL/PF 10 MCG/ML
PLASTIC BAG, INJECTION (ML) INTRAVENOUS
Status: DISPENSED
Start: 2021-11-10

## (undated) RX ORDER — LIDOCAINE HYDROCHLORIDE 20 MG/ML
INJECTION, SOLUTION EPIDURAL; INFILTRATION; INTRACAUDAL; PERINEURAL
Status: DISPENSED
Start: 2021-10-15

## (undated) RX ORDER — ONDANSETRON 2 MG/ML
INJECTION INTRAMUSCULAR; INTRAVENOUS
Status: DISPENSED
Start: 2022-08-19

## (undated) RX ORDER — FUROSEMIDE 10 MG/ML
INJECTION INTRAMUSCULAR; INTRAVENOUS
Status: DISPENSED
Start: 2021-11-10